# Patient Record
Sex: MALE | Race: AMERICAN INDIAN OR ALASKA NATIVE | Employment: UNEMPLOYED | URBAN - METROPOLITAN AREA
[De-identification: names, ages, dates, MRNs, and addresses within clinical notes are randomized per-mention and may not be internally consistent; named-entity substitution may affect disease eponyms.]

---

## 2021-09-21 ENCOUNTER — TELEPHONE (OUTPATIENT)
Dept: INTERNAL MEDICINE CLINIC | Age: 66
End: 2021-09-21

## 2021-09-21 NOTE — TELEPHONE ENCOUNTER
----- Message from Brittany Brasher sent at 9/20/2021 11:39 AM EDT -----  Regarding: Dr. Renetta Wakefield: [phone]  Appointment not available    Caller's first and last name and relationship to patient (if not the patient): n/a      Best contact number: [phone]      Preferred date and time: First available      Scheduled appointment date and time: n/a      Reason for appointment: PT is NP looking to est PCP. Wants to schedule appointment.       Details to clarify the request: n/a      Brittany Brasher

## 2021-09-21 NOTE — TELEPHONE ENCOUNTER
Left patient VM making aware that the first new patient slot available is mid November and to please call the office back to schedule.

## 2021-11-02 ENCOUNTER — OFFICE VISIT (OUTPATIENT)
Dept: INTERNAL MEDICINE CLINIC | Age: 66
End: 2021-11-02
Payer: COMMERCIAL

## 2021-11-02 VITALS
HEART RATE: 77 BPM | BODY MASS INDEX: 27.09 KG/M2 | DIASTOLIC BLOOD PRESSURE: 68 MMHG | SYSTOLIC BLOOD PRESSURE: 132 MMHG | OXYGEN SATURATION: 92 % | TEMPERATURE: 98 F | WEIGHT: 204.4 LBS | HEIGHT: 73 IN | RESPIRATION RATE: 14 BRPM

## 2021-11-02 DIAGNOSIS — Z79.899 ENCOUNTER FOR LONG-TERM (CURRENT) USE OF OTHER MEDICATIONS: ICD-10-CM

## 2021-11-02 DIAGNOSIS — Z76.89 ESTABLISHING CARE WITH NEW DOCTOR, ENCOUNTER FOR: Primary | ICD-10-CM

## 2021-11-02 DIAGNOSIS — J12.82 PNEUMONIA DUE TO COVID-19 VIRUS: ICD-10-CM

## 2021-11-02 DIAGNOSIS — Z11.59 ENCOUNTER FOR HEPATITIS C SCREENING TEST FOR LOW RISK PATIENT: ICD-10-CM

## 2021-11-02 DIAGNOSIS — U07.1 PNEUMONIA DUE TO COVID-19 VIRUS: ICD-10-CM

## 2021-11-02 DIAGNOSIS — F17.200 TOBACCO DEPENDENCE: ICD-10-CM

## 2021-11-02 DIAGNOSIS — Z12.11 COLON CANCER SCREENING: ICD-10-CM

## 2021-11-02 DIAGNOSIS — E78.5 HYPERLIPIDEMIA, UNSPECIFIED HYPERLIPIDEMIA TYPE: ICD-10-CM

## 2021-11-02 PROCEDURE — 99406 BEHAV CHNG SMOKING 3-10 MIN: CPT | Performed by: FAMILY MEDICINE

## 2021-11-02 PROCEDURE — 99203 OFFICE O/P NEW LOW 30 MIN: CPT | Performed by: FAMILY MEDICINE

## 2021-11-02 RX ORDER — ALBUTEROL SULFATE 90 UG/1
2 AEROSOL, METERED RESPIRATORY (INHALATION)
COMMUNITY
Start: 2021-09-10

## 2021-11-02 NOTE — PROGRESS NOTES
SPORTS MEDICINE AND PRIMARY CARE  Mykel Ortega. MD Noa  [address] [address]    Chief Complaint   Patient presents with    New Patient     establish care, history of Covid-19 virus       SUBJECTIVE:    Jessika Baldwin is a 77 y.o. male for [facility] establishment. Past medical history of Covid 19 pneumonia related hospital admission in August, possible COPD and tobacco dependency. Patient returned home oxygen completing use. He has persistent mild MARTINEZ on occasion. Patient denies fevers chills malaise or wheezing. Appetite is good. He recently stopped smoking. Pulmonary visit November 22. Care gaps  Flu vaccine-patient declines  COVID vaccine- no record    Current Outpatient Medications   Medication Sig Dispense Refill    albuterol (PROVENTIL HFA, VENTOLIN HFA, PROAIR HFA) 90 mcg/actuation inhaler Take 2 Puffs by inhalation every four (4) hours as needed. History reviewed. No pertinent past medical history.   Past Surgical History:   Procedure Laterality Date    HX COLONOSCOPY      [facility]     No Known Allergies    REVIEW OF SYSTEMS:  General: negative for - chills or fever  ENT: negative for - headaches, nasal congestion, tinnitus, hearing loss, vision changes, sore throat  Respiratory: shortness of breath; negative for - cough, hemoptysis,  or wheezing  Cardiovascular : negative for - chest pain, edema, palpitations or shortness of breath  Gastrointestinal: negative for - abdominal pain, blood in stools, heartburn or nausea/vomiting, diarrhea, constipation  Genito-Urinary: no dysuria, trouble voiding, hematuria or erectile dysfunction  Musculoskeletal: negative for - gait disturbance, joint pain, joint stiffness , joint swelling, muscle aches  Neurological: no TIA or stroke symptoms  Hematologic: no bruises, no bleeding, no swollen glands  Integument: no lumps, mole changes, nail changes or rash  Endocrine:no malaise/lethargy or unexpected weight changes      Social History Socioeconomic History    Marital status: SINGLE   Tobacco Use    Smoking status: Former Smoker     Years: 50.00     Types: Cigarettes     Quit date: 2021     Years since quittin.1    Smokeless tobacco: Never Used   Vaping Use    Vaping Use: Never used   Substance and Sexual Activity    Alcohol use: Yes     Alcohol/week: 2.0 standard drinks     Types: 2 Cans of beer per week    Drug use: Never    Sexual activity: Not Currently     Family History   Problem Relation Age of Onset    Hearing Impairment Brother        OBJECTIVE:     Visit Vitals  /68   Pulse 77   Temp 98 °F (36.7 °C) (Oral)   Resp 14   Ht 6' 1\" (1.854 m)   Wt 204 lb 6.4 oz (92.7 kg)   SpO2 92%   BMI 26.97 kg/m²     CONSTITUTIONAL: well developed, well nourished, appears age appropriate  EYES: perrla, eom intact  ENMT:moist mucous membranes, pharynx clear  NECK: supple. Thyroid normal  RESPIRATORY: Chest: clear bilaterally  CARDIOVASCULAR: Heart: regular rate and rhythm pulse 1+  GASTROINTESTINAL: Abdomen: soft, bowel sounds active  HEMATOLOGIC: no pathological lymph nodes palpated  MUSCULOSKELETAL: Extremities: no edema,   INTEGUMENT: No unusual rashes or suspicious skin lesions noted. Nails appear normal.  NEUROLOGIC: non-focal exam   MENTAL STATUS: alert and oriented, appropriate affect     ASSESSMENT:   1. Establishing care with new doctor, encounter for    2. Pneumonia due to COVID-19 virus -largely resolved symptoms   3. Hyperlipidemia screening   4. Encounter for long-term (current) use of other medications    5. Colon cancer screening    6.  Encounter for hepatitis C screening test for low risk patient        PLAN:  .  Orders Placed This Encounter    LIPID PANEL    OCCULT BLOOD IMMUNOASSAY,DIAGNOSTIC    HEPATITIS C AB    CBC WITH AUTOMATED DIFF    METABOLIC PANEL, COMPREHENSIVE    albuterol (PROVENTIL HFA, VENTOLIN HFA, PROAIR HFA) 90 mcg/actuation inhaler     Pulmonary follow-up scheduled this month    Follow-up and Dispositions    · Return in about 3 months (around 2/2/2022). I have discussed the diagnosis with the patient and the intended plan as seen in the  orders above. The patient understands and agrees with the plan. The patient has   received an after visit summary. Questions were answered concerning  future plans  Patient labs and/or xrays were reviewed as available. Past records were reviewed as available. Counseled regarding diet, exercise and healthy lifestyle      TOBACCO COUNSELING:  Spent 3minutes discussing the risks of smoking and the benefits of smoking cessation as well as the long term sequelae of smoking with the pt who verbalized his understanding. Reviewed strategies for success, including gradually decreasing the number of cigarettes smoked a day. Advised patient to proceed to urgent care, call back or return to office if symptoms develop/worsen/change/persist.    Discussed expected course/resolution/complications of diagnosis in detail with patient. Medication risks/benefits/costs/interactions/alternatives discussed with patient    Savanah Chowdhury M.D. This note was created using voice recognition software.   Edits have been made but syntax errors might exist.

## 2021-11-02 NOTE — PROGRESS NOTES
Chief Complaint   Patient presents with    New Patient     establish care, history of Covid-19 virus         1. Have you been to the ER, urgent care clinic since your last visit? Hospitalized since your last visit? Yes When: 3 months ago  Where: [facility]  Reason for visit: positive for Covid-19    2. Have you seen or consulted any other health care providers outside of the [address] [place] [address] since your last visit? Include any pap smears or colon screening.  No

## 2021-11-03 LAB
ALBUMIN SERPL-MCNC: 3.8 G/DL (ref 3.5–5)
ALBUMIN/GLOB SERPL: 1.2 {RATIO} (ref 1.1–2.2)
ALP SERPL-CCNC: 102 U/L (ref 45–117)
ALT SERPL-CCNC: 23 U/L (ref 12–78)
ANION GAP SERPL CALC-SCNC: 3 MMOL/L (ref 5–15)
AST SERPL-CCNC: 14 U/L (ref 15–37)
BASOPHILS # BLD: 0 K/UL (ref 0–0.1)
BASOPHILS NFR BLD: 0 % (ref 0–1)
BILIRUB SERPL-MCNC: 0.5 MG/DL (ref 0.2–1)
BUN SERPL-MCNC: 17 MG/DL (ref 6–20)
BUN/CREAT SERPL: 17 (ref 12–20)
CALCIUM SERPL-MCNC: 9.2 MG/DL (ref 8.5–10.1)
CHLORIDE SERPL-SCNC: 108 MMOL/L (ref 97–108)
CHOLEST SERPL-MCNC: 145 MG/DL
CO2 SERPL-SCNC: 27 MMOL/L (ref 21–32)
CREAT SERPL-MCNC: 1 MG/DL (ref 0.7–1.3)
DIFFERENTIAL METHOD BLD: NORMAL
EOSINOPHIL # BLD: 0.2 K/UL (ref 0–0.4)
EOSINOPHIL NFR BLD: 2 % (ref 0–7)
ERYTHROCYTE [DISTWIDTH] IN BLOOD BY AUTOMATED COUNT: 13.6 % (ref 11.5–14.5)
GLOBULIN SER CALC-MCNC: 3.2 G/DL (ref 2–4)
GLUCOSE SERPL-MCNC: 103 MG/DL (ref 65–100)
HCT VFR BLD AUTO: 44.9 % (ref 36.6–50.3)
HCV AB SERPL QL IA: NONREACTIVE
HDLC SERPL-MCNC: 37 MG/DL
HDLC SERPL: 3.9 {RATIO} (ref 0–5)
HGB BLD-MCNC: 14.1 G/DL (ref 12.1–17)
IMM GRANULOCYTES # BLD AUTO: 0 K/UL (ref 0–0.04)
IMM GRANULOCYTES NFR BLD AUTO: 0 % (ref 0–0.5)
LDLC SERPL CALC-MCNC: 77 MG/DL (ref 0–100)
LYMPHOCYTES # BLD: 1.9 K/UL (ref 0.8–3.5)
LYMPHOCYTES NFR BLD: 21 % (ref 12–49)
MCH RBC QN AUTO: 28.7 PG (ref 26–34)
MCHC RBC AUTO-ENTMCNC: 31.4 G/DL (ref 30–36.5)
MCV RBC AUTO: 91.3 FL (ref 80–99)
MONOCYTES # BLD: 0.6 K/UL (ref 0–1)
MONOCYTES NFR BLD: 7 % (ref 5–13)
NEUTS SEG # BLD: 6.2 K/UL (ref 1.8–8)
NEUTS SEG NFR BLD: 70 % (ref 32–75)
NRBC # BLD: 0 K/UL (ref 0–0.01)
NRBC BLD-RTO: 0 PER 100 WBC
PLATELET # BLD AUTO: 294 K/UL (ref 150–400)
PMV BLD AUTO: 10.6 FL (ref 8.9–12.9)
POTASSIUM SERPL-SCNC: 4.8 MMOL/L (ref 3.5–5.1)
PROT SERPL-MCNC: 7 G/DL (ref 6.4–8.2)
RBC # BLD AUTO: 4.92 M/UL (ref 4.1–5.7)
SODIUM SERPL-SCNC: 138 MMOL/L (ref 136–145)
TRIGL SERPL-MCNC: 155 MG/DL (ref ?–150)
VLDLC SERPL CALC-MCNC: 31 MG/DL
WBC # BLD AUTO: 8.9 K/UL (ref 4.1–11.1)

## 2021-11-05 NOTE — ACP (ADVANCE CARE PLANNING)
Advance Care Planning     General Advance Care Planning (ACP) Conversation      Date of Conversation: 11/2/2021  Conducted with: Patient with Decision Making Capacity    Healthcare Decision Maker:   No healthcare decision makers have been documented.    Click here to complete [address] including selection of the Healthcare Decision Maker Relationship (ie \"Primary\")        Content/Action Overview:   Has NO ACP documents/care preferences - information provided, considering goals and options  Reviewed DNR/DNI and patient elects Full Code (Attempt Resuscitation)         Length of Voluntary ACP Conversation in minutes:  <16 minutes (Non-Billable)    Markel Garner MD

## 2022-02-03 ENCOUNTER — OFFICE VISIT (OUTPATIENT)
Dept: INTERNAL MEDICINE CLINIC | Age: 67
End: 2022-02-03
Payer: MEDICARE

## 2022-02-03 VITALS
RESPIRATION RATE: 18 BRPM | DIASTOLIC BLOOD PRESSURE: 88 MMHG | HEIGHT: 73 IN | BODY MASS INDEX: 29.49 KG/M2 | WEIGHT: 222.5 LBS | OXYGEN SATURATION: 95 % | TEMPERATURE: 99 F | HEART RATE: 80 BPM | SYSTOLIC BLOOD PRESSURE: 172 MMHG

## 2022-02-03 DIAGNOSIS — R03.0 ELEVATED BLOOD PRESSURE READING IN OFFICE WITHOUT DIAGNOSIS OF HYPERTENSION: ICD-10-CM

## 2022-02-03 DIAGNOSIS — Z23 ENCOUNTER FOR IMMUNIZATION: ICD-10-CM

## 2022-02-03 DIAGNOSIS — Z87.01 HISTORY OF VIRAL PNEUMONIA: ICD-10-CM

## 2022-02-03 DIAGNOSIS — U09.9 COVID-19 LONG HAULER: Primary | ICD-10-CM

## 2022-02-03 DIAGNOSIS — Z71.85 VACCINE COUNSELING: ICD-10-CM

## 2022-02-03 PROCEDURE — 99213 OFFICE O/P EST LOW 20 MIN: CPT | Performed by: FAMILY MEDICINE

## 2022-02-03 PROCEDURE — 2000F BLOOD PRESSURE MEASURE: CPT | Performed by: FAMILY MEDICINE

## 2022-02-03 PROCEDURE — 90732 PPSV23 VACC 2 YRS+ SUBQ/IM: CPT | Performed by: FAMILY MEDICINE

## 2022-02-03 NOTE — PROGRESS NOTES
SPORTS MEDICINE AND PRIMARY CARE  Lynnette Guerrero. MD [place]  [address] [address]    Chief Complaint   Patient presents with    Follow-up    Pneumonia     september 2021       SUBJECTIVE:    Haritha Awan is a 79 y.o. male for follow-up evaluation. Covid pneumonia diagnosed in August 2021. Doing well at this point. Home pulse ox running 96%. Consulted with pulmonary medicine. Had a negative CT scan of the lungs. Had pulmonary function testing. Using Spiriva and albuterol mdis. REVIEW OF SYSTEMS:  General: negative for - chills or fever  ENT: negative for - headaches, nasal congestion, tinnitus, hearing loss, vision changes, sore throat  Respiratory: negative for - cough, hemoptysis, shortness of breath or wheezing  Cardiovascular : negative for - chest pain, edema, palpitations or shortness of breath  Gastrointestinal: negative for - abdominal pain, blood in stools, heartburn or nausea/vomiting, diarrhea, constipation  Genito-Urinary: no dysuria, trouble voiding, hematuria or erectile dysfunction  Musculoskeletal: negative for - gait disturbance, joint pain, joint stiffness , joint swelling, muscle aches  Neurological: no TIA or stroke symptoms  Hematologic: no bruises, no bleeding, no swollen glands  Integument: no lumps, mole changes, nail changes or rash  Endocrine:no malaise/lethargy or unexpected weight changes    Blood pressure elevation  No hx htn, bp normal at recent medical evaluations  Had a few drinks last night    Care gaps  covid vaccine- hesitant  Pneumovax- no history  crc screening- 2020,  [facility], no polyps  Current Outpatient Medications   Medication Sig Dispense Refill    tiotropium bromide (Spiriva Respimat) 2.5 mcg/actuation inhaler Take 2 Puffs by inhalation daily. Indications: bronchospasm prevention with COPD, copd      albuterol (PROVENTIL HFA, VENTOLIN HFA, PROAIR HFA) 90 mcg/actuation inhaler Take 2 Puffs by inhalation every four (4) hours as needed.        Past Medical History:   Diagnosis Date    Chronic obstructive pulmonary disease (HCC)      Past Surgical History:   Procedure Laterality Date    HX COLONOSCOPY      [facility]     No Known Allergies        Social History     Socioeconomic History    Marital status: SINGLE   Tobacco Use    Smoking status: Former Smoker     Years: 50.00     Types: Cigarettes     Quit date: 2021     Years since quittin.4    Smokeless tobacco: Never Used   Vaping Use    Vaping Use: Never used   Substance and Sexual Activity    Alcohol use: Yes     Alcohol/week: 2.0 standard drinks     Types: 2 Cans of beer per week    Drug use: Never    Sexual activity: Not Currently     Family History   Problem Relation Age of Onset    Hearing Impairment Brother        OBJECTIVE:     Visit Vitals  BP (!) 172/88   Pulse 80   Temp 99 °F (37.2 °C) (Oral)   Resp 18   Ht 6' 1\" (1.854 m)   Wt 222 lb 8 oz (100.9 kg)   SpO2 95%   BMI 29.36 kg/m²   CONSTITUTIONAL:  appears in usual state of health  EYES:  eom intact  ENMT:moist mucous membranes,  NECK: supple. COR:rrr   RESPIRATORY: Chest: clear bilaterally  INTEGUMENT: warm and dry  NEUROLOGIC: non-focal exam   MENTAL STATUS: alert and oriented, appropriate affect     No visits with results within 3 Month(s) from this visit.    Latest known visit with results is:   Office Visit on 2021   Component Date Value Ref Range Status    Sodium 2021 138  136 - 145 mmol/L Final    Potassium 2021 4.8  3.5 - 5.1 mmol/L Final    Chloride 2021 108  97 - 108 mmol/L Final    CO2 2021 27  21 - 32 mmol/L Final    Anion gap 2021 3* 5 - 15 mmol/L Final    Glucose 2021 103* 65 - 100 mg/dL Final    BUN 2021 17  6 - 20 MG/DL Final    Creatinine 2021 1.00  0.70 - 1.30 MG/DL Final    BUN/Creatinine ratio 2021 17  12 - 20   Final    GFR est AA 2021 >60  >60 ml/min/1.73m2 Final    GFR est non-AA 2021 >60  >60 ml/min/1.73m2 Final    Comment: Estimated GFR is calculated using the IDMS-traceable Modification of Diet in  Renal Disease (MDRD) Study equation, reported for both  Americans  (GFRAA) and non- Americans (GFRNA), and normalized to 1.73m2 body  surface area. The physician must decide which value applies to the patient.  Calcium 11/02/2021 9.2  8.5 - 10.1 MG/DL Final    Bilirubin, total 11/02/2021 0.5  0.2 - 1.0 MG/DL Final    ALT (SGPT) 11/02/2021 23  12 - 78 U/L Final    AST (SGOT) 11/02/2021 14* 15 - 37 U/L Final    Alk. phosphatase 11/02/2021 102  45 - 117 U/L Final    Protein, total 11/02/2021 7.0  6.4 - 8.2 g/dL Final    Albumin 11/02/2021 3.8  3.5 - 5.0 g/dL Final    Globulin 11/02/2021 3.2  2.0 - 4.0 g/dL Final    A-G Ratio 11/02/2021 1.2  1.1 - 2.2   Final    WBC 11/02/2021 8.9  4.1 - 11.1 K/uL Final    RBC 11/02/2021 4.92  4.10 - 5.70 M/uL Final    HGB 11/02/2021 14.1  12.1 - 17.0 g/dL Final    HCT 11/02/2021 44.9  36.6 - 50.3 % Final    MCV 11/02/2021 91.3  80.0 - 99.0 FL Final    MCH 11/02/2021 28.7  26.0 - 34.0 PG Final    MCHC 11/02/2021 31.4  30.0 - 36.5 g/dL Final    RDW 11/02/2021 13.6  11.5 - 14.5 % Final    PLATELET 77/48/5800 563  150 - 400 K/uL Final    MPV 11/02/2021 10.6  8.9 - 12.9 FL Final    NRBC 11/02/2021 0.0  0  WBC Final    ABSOLUTE NRBC 11/02/2021 0.00  0.00 - 0.01 K/uL Final    NEUTROPHILS 11/02/2021 70  32 - 75 % Final    LYMPHOCYTES 11/02/2021 21  12 - 49 % Final    MONOCYTES 11/02/2021 7  5 - 13 % Final    EOSINOPHILS 11/02/2021 2  0 - 7 % Final    BASOPHILS 11/02/2021 0  0 - 1 % Final    IMMATURE GRANULOCYTES 11/02/2021 0  0.0 - 0.5 % Final    ABS. NEUTROPHILS 11/02/2021 6.2  1.8 - 8.0 K/UL Final    ABS. LYMPHOCYTES 11/02/2021 1.9  0.8 - 3.5 K/UL Final    ABS. MONOCYTES 11/02/2021 0.6  0.0 - 1.0 K/UL Final    ABS. EOSINOPHILS 11/02/2021 0.2  0.0 - 0.4 K/UL Final    ABS. BASOPHILS 11/02/2021 0.0  0.0 - 0.1 K/UL Final    ABS. IMM. GRANS.  11/02/2021 0.0  0.00 - 0.04 K/UL Final    DF 11/02/2021 AUTOMATED    Final    Hep C virus Ab Interp. 11/02/2021 NONREACTIVE  NONREACTIVE   Final    Method used is Siemens Advia Centaur    Cholesterol, total 11/02/2021 145  <200 MG/DL Final    Triglyceride 11/02/2021 155* <150 MG/DL Final    Comment: Based on NCEP-ATP III:  Triglycerides <150 mg/dL  is considered normal, 150-199  mg/dL  borderline high,  200-499 mg/dL high and  greater than or equal to 500  mg/dL very high.  HDL Cholesterol 11/02/2021 37  MG/DL Final    Comment: Based on NCEP ATP III, HDL Cholesterol <40 mg/dL is considered low and >60  mg/dL is elevated.  LDL, calculated 11/02/2021 77  0 - 100 MG/DL Final    Comment: Based on the NCEP-ATP: LDL-C concentrations are considered  optimal <100 mg/dL,  near optimal/above Normal 100-129 mg/dL Borderline High: 130-159, High: 160-189  mg/dL Very High: Greater than or equal to 190 mg/dL      VLDL, calculated 11/02/2021 31  MG/DL Final    CHOL/HDL Ratio 11/02/2021 3.9  0.0 - 5.0   Final       ASSESSMENT:   1. COVID-19 long hauler    2. History of viral pneumonia    3. Elevated blood pressure reading in office without diagnosis of hypertension    4. Vaccine counseling    5. Encounter for immunization      pulmonary status stable following covid pneumonia in august 2021-long haul symptoms suspected  Elevated blood pressure following etoh, no history of chronic htn       Orders Placed This Encounter    Pneumococcal polysaccharide vaccine, 23-valent, adult or immunosuppressed pt dose    tiotropium bromide (Spiriva Respimat) 2.5 mcg/actuation inhaler       Follow-up and Dispositions    · Return in about 2 weeks (around 2/17/2022) for blood pressure follow up, annual wellness exam.  Follow-up and Disposition History         I have discussed the diagnosis with the patient and the intended plan as seen in the  orders above. The patient understands and agrees with the plan. The patient has   received an after visit summary. Questions were answered concerning  future plans  Patient labs and/or xrays were reviewed as available. Past records were reviewed as available. Counseled regarding diet, exercise and healthy lifestyle        Advised patient to call back or return to office if symptoms develop/worsen/change/persist.  Discussed expected course/resolution/complications of diagnosis in detail with patient. Medication risks/benefits/costs/interactions/alternatives considered    Signed,  Roberto Lemus M.D. This note was created using voice recognition software.   Edits have been made but syntax errors might exist.

## 2022-02-03 NOTE — PROGRESS NOTES
Chief Complaint   Patient presents with    Follow-up    Pneumonia     september 2021     1. Have you been to the ER, urgent care clinic since your last visit? Hospitalized since your last visit? No    2. Have you seen or consulted any other health care providers outside of the [address] [place] [address] since your last visit? Include any pap smears or colon screening.  No  Visit Vitals  BP (!) 172/88   Pulse 80   Temp 99 °F (37.2 °C) (Oral)   Resp 18   Ht 6' 1\" (1.854 m)   Wt 222 lb 8 oz (100.9 kg)   SpO2 95%   BMI 29.36 kg/m²

## 2022-02-17 ENCOUNTER — OFFICE VISIT (OUTPATIENT)
Dept: INTERNAL MEDICINE CLINIC | Age: 67
End: 2022-02-17
Payer: MEDICARE

## 2022-02-17 VITALS
OXYGEN SATURATION: 98 % | BODY MASS INDEX: 29.28 KG/M2 | TEMPERATURE: 98.5 F | HEART RATE: 91 BPM | RESPIRATION RATE: 18 BRPM | SYSTOLIC BLOOD PRESSURE: 146 MMHG | HEIGHT: 73 IN | WEIGHT: 220.9 LBS | DIASTOLIC BLOOD PRESSURE: 88 MMHG

## 2022-02-17 DIAGNOSIS — L30.9 DERMATITIS: ICD-10-CM

## 2022-02-17 DIAGNOSIS — Z87.891 HISTORY OF TOBACCO USE: ICD-10-CM

## 2022-02-17 DIAGNOSIS — I25.2 ECG: OLD MYOCARDIAL INFARCTION: ICD-10-CM

## 2022-02-17 DIAGNOSIS — Z00.00 MEDICARE ANNUAL WELLNESS VISIT, SUBSEQUENT: Primary | ICD-10-CM

## 2022-02-17 DIAGNOSIS — I73.9 PAD (PERIPHERAL ARTERY DISEASE) (HCC): ICD-10-CM

## 2022-02-17 DIAGNOSIS — I48.91 ATRIAL FIBRILLATION, UNSPECIFIED TYPE (HCC): ICD-10-CM

## 2022-02-17 PROCEDURE — 3017F COLORECTAL CA SCREEN DOC REV: CPT | Performed by: FAMILY MEDICINE

## 2022-02-17 PROCEDURE — G8536 NO DOC ELDER MAL SCRN: HCPCS | Performed by: FAMILY MEDICINE

## 2022-02-17 PROCEDURE — 99214 OFFICE O/P EST MOD 30 MIN: CPT | Performed by: FAMILY MEDICINE

## 2022-02-17 PROCEDURE — 1101F PT FALLS ASSESS-DOCD LE1/YR: CPT | Performed by: FAMILY MEDICINE

## 2022-02-17 PROCEDURE — G0439 PPPS, SUBSEQ VISIT: HCPCS | Performed by: FAMILY MEDICINE

## 2022-02-17 PROCEDURE — 93000 ELECTROCARDIOGRAM COMPLETE: CPT | Performed by: FAMILY MEDICINE

## 2022-02-17 PROCEDURE — G8419 CALC BMI OUT NRM PARAM NOF/U: HCPCS | Performed by: FAMILY MEDICINE

## 2022-02-17 PROCEDURE — G8427 DOCREV CUR MEDS BY ELIG CLIN: HCPCS | Performed by: FAMILY MEDICINE

## 2022-02-17 PROCEDURE — G8510 SCR DEP NEG, NO PLAN REQD: HCPCS | Performed by: FAMILY MEDICINE

## 2022-02-17 NOTE — PROGRESS NOTES
SPORTS MEDICINE AND PRIMARY CARE  Brittaney Tierney. MD [place]  [address]  [id number]    Chief Complaint   Patient presents with    Complete Physical     Kilo Thompson is a 79 y.o. male for CPE. Patient voices no complaints. SUBJECTIVE:  1.  Do you follow a low fat diet?  no  2. Are you up to date on your Tdap (<10 years)?  no  3. Have you ever had a Pneumovax vaccine (>65)  no              PCV13               PPSV23   4. Have you had Zoster vaccine (>50)? no  5. Have you had the HPV - Gardasil (13- 46)? na  6. Have you had a screening test for hepatitis C virus? yes  7. Do you smoke? No   If > 65 and smoker, have you had a abdominal aortic aneurysm ultrasound screen? Do you agree to CT scan screening for lung cancer (age 52-79yo with 20pk-yr history and smokes or quit within          the past 15 yrs  6. Do you consider yourself overweight?  no  9. Is there a family history of CAD< age 48? no  8. Is there a family history of Cancer?  none recorded  11. Do you know your Cancer risks? yes  12. Have you had a colonoscopy? yes, [facility]  13. Have you been tested for HIV or other STI's?  (18-71 y/o)? yes  14. Have had a bone density scan or DEXA done(>65)? no  15. Have you had an EKG performed in the last five years (>50)? no  16. Have you had a PSA test done this year (50-69)? no  17. Do you follow a regular exercise program? yes    Current Outpatient Medications   Medication Sig Dispense Refill    tiotropium bromide (Spiriva Respimat) 2.5 mcg/actuation inhaler Take 2 Puffs by inhalation daily. Indications: bronchospasm prevention with COPD, copd      albuterol (PROVENTIL HFA, VENTOLIN HFA, PROAIR HFA) 90 mcg/actuation inhaler Take 2 Puffs by inhalation every four (4) hours as needed.        Past Medical History:   Diagnosis Date    Chronic obstructive pulmonary disease ([address]      Past Surgical History:   Procedure Laterality Date    HX COLONOSCOPY      [facility]     No Known Allergies    REVIEW OF SYSTEMS:  General: negative for - chills or fever  ENT: negative for - headaches, nasal congestion, tinnitus, hearing loss, vision changes, sore throat  Respiratory: negative for - cough, hemoptysis, shortness of breath or wheezing  Cardiovascular :palpitations, remote history; negative for - chest pain, edema, or shortness of breath  Gastrointestinal: negative for - abdominal pain, blood in stools, heartburn or nausea/vomiting, diarrhea, constipation  Genito-Urinary: no dysuria, trouble voiding, hematuria or erectile dysfunction  Musculoskeletal: negative for - gait disturbance, joint pain, joint stiffness , joint swelling, muscle aches  Neurological: no TIA or stroke symptoms  Hematologic: no bruises, no bleeding, no swollen glands  Integument: no lumps, mole changes, nail changes or rash  Endocrine:no malaise/lethargy or unexpected weight changes      Social History     Socioeconomic History    Marital status: SINGLE   Tobacco Use    Smoking status: Former Smoker     Years: 50.00     Types: Cigarettes     Quit date: 2021     Years since quittin.4    Smokeless tobacco: Never Used   Vaping Use    Vaping Use: Never used   Substance and Sexual Activity    Alcohol use: Yes     Alcohol/week: 2.0 standard drinks     Types: 2 Cans of beer per week    Drug use: Never    Sexual activity: Not Currently     Family History   Problem Relation Age of Onset    Hearing Impairment Brother        OBJECTIVE:     Visit Vitals  BP (!) 146/88   Pulse 91   Temp 98.5 °F (36.9 °C) (Oral)   Resp 18   Ht 6' 1\" (1.854 m)   Wt 220 lb 14.4 oz (100.2 kg)   SpO2 98%   BMI 29.14 kg/m²     General appearance: alert, cooperative, no distress, appears stated age  Head: Normocephalic, without obvious abnormality, atraumatic  Eyes: conjunctivae/corneas clear. PERRL, EOM's intact. Ears: normal TM's and external ear canals AU  Nose: Nares normal. Septum midline.  Mucosa normal. No drainage or sinus tenderness. Throat: Lips, mucosa, and tongue normal. Teeth and gums normal  Neck: supple, symmetrical, trachea midline, no adenopathy, thyroid: not enlarged, symmetric, no tenderness/mass/nodules, no carotid bruit and no JVD  Back: negative, symmetric, no curvature. ROM normal. No CVA tenderness. Lungs: clear to auscultation bilaterally  Breasts: no mass  Heart: normal rate, irregular  rhythm, S1, S2 normal, no murmur, click, rub or gallop  Abdomen: soft, non-tender. Bowel sounds normal. No masses,  no organomegaly  Pelvic: deferred  Extremities: extremities normal, atraumatic, no cyanosis or edema  Pulses: 1+ and symmetric  Skin: Skin color, texture, turgor normal.  Extensive scaling and redness over BLE between calves and ankles  Lymph nodes: Cervical, supraclavicular, and axillary nodes normal.  Neurologic: Alert and oriented X 3, normal strength and tone. Normal symmetric reflexes. Normal coordination and gait    No visits with results within 3 Month(s) from this visit. Latest known visit with results is:   Office Visit on 11/02/2021   Component Date Value Ref Range Status    Sodium 11/02/2021 138  136 - 145 mmol/L Final    Potassium 11/02/2021 4.8  3.5 - 5.1 mmol/L Final    Chloride 11/02/2021 108  97 - 108 mmol/L Final    CO2 11/02/2021 27  21 - 32 mmol/L Final    Anion gap 11/02/2021 3* 5 - 15 mmol/L Final    Glucose 11/02/2021 103* 65 - 100 mg/dL Final    BUN 11/02/2021 17  6 - 20 MG/DL Final    Creatinine 11/02/2021 1.00  0.70 - 1.30 MG/DL Final    BUN/Creatinine ratio 11/02/2021 17  12 - 20   Final    GFR est AA 11/02/2021 >60  >60 ml/min/1.73m2 Final    GFR est non-AA 11/02/2021 >60  >60 ml/min/1.73m2 Final    Comment: Estimated GFR is calculated using the IDMS-traceable Modification of Diet in  Renal Disease (MDRD) Study equation, reported for both  Americans  (GFRAA) and non- Americans (GFRNA), and normalized to 1.73m2 body  surface area.  The physician must decide which value applies to the patient.  Calcium 11/02/2021 9.2  8.5 - 10.1 MG/DL Final    Bilirubin, total 11/02/2021 0.5  0.2 - 1.0 MG/DL Final    ALT (SGPT) 11/02/2021 23  12 - 78 U/L Final    AST (SGOT) 11/02/2021 14* 15 - 37 U/L Final    Alk. phosphatase 11/02/2021 102  45 - 117 U/L Final    Protein, total 11/02/2021 7.0  6.4 - 8.2 g/dL Final    Albumin 11/02/2021 3.8  3.5 - 5.0 g/dL Final    Globulin 11/02/2021 3.2  2.0 - 4.0 g/dL Final    A-G Ratio 11/02/2021 1.2  1.1 - 2.2   Final    WBC 11/02/2021 8.9  4.1 - 11.1 K/uL Final    RBC 11/02/2021 4.92  4.10 - 5.70 M/uL Final    HGB 11/02/2021 14.1  12.1 - 17.0 g/dL Final    HCT 11/02/2021 44.9  36.6 - 50.3 % Final    MCV 11/02/2021 91.3  80.0 - 99.0 FL Final    MCH 11/02/2021 28.7  26.0 - 34.0 PG Final    MCHC 11/02/2021 31.4  30.0 - 36.5 g/dL Final    RDW 11/02/2021 13.6  11.5 - 14.5 % Final    PLATELET 39/61/3001 281  150 - 400 K/uL Final    MPV 11/02/2021 10.6  8.9 - 12.9 FL Final    NRBC 11/02/2021 0.0  0  WBC Final    ABSOLUTE NRBC 11/02/2021 0.00  0.00 - 0.01 K/uL Final    NEUTROPHILS 11/02/2021 70  32 - 75 % Final    LYMPHOCYTES 11/02/2021 21  12 - 49 % Final    MONOCYTES 11/02/2021 7  5 - 13 % Final    EOSINOPHILS 11/02/2021 2  0 - 7 % Final    BASOPHILS 11/02/2021 0  0 - 1 % Final    IMMATURE GRANULOCYTES 11/02/2021 0  0.0 - 0.5 % Final    ABS. NEUTROPHILS 11/02/2021 6.2  1.8 - 8.0 K/UL Final    ABS. LYMPHOCYTES 11/02/2021 1.9  0.8 - 3.5 K/UL Final    ABS. MONOCYTES 11/02/2021 0.6  0.0 - 1.0 K/UL Final    ABS. EOSINOPHILS 11/02/2021 0.2  0.0 - 0.4 K/UL Final    ABS. BASOPHILS 11/02/2021 0.0  0.0 - 0.1 K/UL Final    ABS. IMM. GRANS. 11/02/2021 0.0  0.00 - 0.04 K/UL Final    DF 11/02/2021 AUTOMATED    Final    Hep C virus Ab Interp.  11/02/2021 NONREACTIVE  NONREACTIVE   Final    Method used is Siemens Advia Centaur    Cholesterol, total 11/02/2021 145  <200 MG/DL Final    Triglyceride 11/02/2021 155* <150 MG/DL Final Comment: Based on NCEP-ATP III:  Triglycerides <150 mg/dL  is considered normal, 150-199  mg/dL  borderline high,  200-499 mg/dL high and  greater than or equal to 500  mg/dL very high.  HDL Cholesterol 11/02/2021 37  MG/DL Final    Comment: Based on NCEP ATP III, HDL Cholesterol <40 mg/dL is considered low and >60  mg/dL is elevated.  LDL, calculated 11/02/2021 77  0 - 100 MG/DL Final    Comment: Based on the NCEP-ATP: LDL-C concentrations are considered  optimal <100 mg/dL,  near optimal/above Normal 100-129 mg/dL Borderline High: 130-159, High: 160-189  mg/dL Very High: Greater than or equal to 190 mg/dL      VLDL, calculated 11/02/2021 31  MG/DL Final    CHOL/HDL Ratio 11/02/2021 3.9  0.0 - 5.0   Final       ASSESSMENT/PLAN:  1. Medicare annual wellness visit, subsequent    2. Atrial fibrillation, unspecified type ([facility] [address]    3. Dermatitis    4. PAD (peripheral artery disease) ([facility] [address]    5. ECG: old myocardial infarction    6. History of tobacco use      EKG consistent with atrial fibrillation, normal ventricular response. No history of this. Referred to emergency department. EKG consistent with old anteroseptal infarct. Refer to cardiology and ED follow-up visit. Peripheral arterial disease involving the lower extremities and associated dermatitis. Check ARISTIDES at follow-up visit. Smoking history. Refer for abdominal aortic ultrasound and inquire about last year he smoked to see if he qualifies for screening low-dose CT chest.  Prostate cancer screening. No history of LUTS. PSA screening next blood draw. Orders Placed This Encounter    US EXAM SCREENING AAA    AMB POC EKG ROUTINE W/ 12 LEADS, INTER & REP     I have discussed the diagnosis with the patient and the intended plan as seen in the  orders above. The patient understands and agrees with the plan. The patient has   received an after visit summary.  Questions were answered concerning  future plans  Patient labs and/or xrays were reviewed as available. Past records were reviewed as available. Counseled regarding diet, exercise and healthy lifestyle     Advised patient to proceed to urgent care  Discussed expected course/resolution/complications of diagnosis in detail with patient. Ariadna Taylor M.D. This note was created using voice recognition software.   Edits have been made but syntax errors might exist.

## 2022-02-17 NOTE — PROGRESS NOTES
Chief Complaint   Patient presents with    Complete Physical       1. Have you been to the ER, urgent care clinic since your last visit? Hospitalized since your last visit? No    2. Have you seen or consulted any other health care providers outside of the [address] [place] [address] since your last visit? Include any pap smears or colon screening. No  Visit Vitals  BP (!) 146/88   Pulse 91   Temp 98.5 °F (36.9 °C) (Oral)   Resp 18   Ht 6' 1\" (1.854 m)   Wt 220 lb 14.4 oz (100.2 kg)   SpO2 98%   BMI 29.14 kg/m²       This is the Subsequent Medicare Annual Wellness Exam, performed 12 months or more after the Initial AWV or the last Subsequent AWV    I have reviewed the patient's medical history in detail and updated the computerized patient record. Assessment/Plan   Education and counseling provided:  Are appropriate based on today's review and evaluation    1. Medicare annual wellness visit, subsequent  -     AMB POC EKG ROUTINE W/ 12 LEADS, INTER & REP  2. Atrial fibrillation, unspecified type ([facility] [address]  3. Dermatitis  4. PAD (peripheral artery disease) ([facility] [address]  5. ECG: old myocardial infarction  6. History of tobacco use  -     US EXAM SCREENING AAA;  Future       Depression Risk Factor Screening     3 most recent PHQ Screens 2/17/2022   Little interest or pleasure in doing things Not at all   Feeling down, depressed, irritable, or hopeless Not at all   Total Score PHQ 2 0   Trouble falling or staying asleep, or sleeping too much Not at all   Feeling tired or having little energy Not at all   Poor appetite, weight loss, or overeating Not at all   Feeling bad about yourself - or that you are a failure or have let yourself or your family down Not at all   Trouble concentrating on things such as school, work, reading, or watching TV Not at all   Moving or speaking so slowly that other people could have noticed; or the opposite being so fidgety that others notice Not at all   Thoughts of being better off dead, or hurting yourself in some way Not at all   PHQ 9 Score 0   How difficult have these problems made it for you to do your work, take care of your home and get along with others Not difficult at all       Alcohol & Drug Abuse Risk Screen    Do you average more than 1 drink per night or more than 7 drinks a week: No    In the past three months have you have had more than 4 drinks containing alcohol on one occasion: No          Functional Ability and Level of Safety    Hearing: Hearing is good. Activities of Daily Living: The home contains: no safety equipment. Patient does total self care      Ambulation: with no difficulty     Fall Risk:  Fall Risk Assessment, last 12 mths 2/17/2022   Able to walk? Yes   Fall in past 12 months? 0   Do you feel unsteady? 0   Are you worried about falling 0      Abuse Screen:  Patient is not abused       Cognitive Screening    Has your family/caregiver stated any concerns about your memory: no     Cognitive Screening: Normal - MMSE (Mini Mental Status Exam)    Health Maintenance Due     Health Maintenance Due   Topic Date Due    COVID-19 Vaccine (1) Never done    Colorectal Cancer Screening Combo  Never done    Flu Vaccine (1) Never done       Patient Care Team   Patient Care Team:  Ravi Haskins MD as PCP - General (Family Medicine)  Ravi Haskins MD as PCP - [facility]    History     Patient Active Problem List   Diagnosis Code    Atrial fibrillation [facility]) I48.91    Dermatitis L30.9    PAD (peripheral artery disease) ([facility] [address] I73.9    ECG: old myocardial infarction I25.2    History of tobacco use Z87.891     Past Medical History:   Diagnosis Date    Chronic obstructive pulmonary disease ([facility] [address]       Past Surgical History:   Procedure Laterality Date    [facility]X COLONOSCOPY      [facility]     Current Outpatient Medications   Medication Sig Dispense Refill    tiotropium bromide (Spiriva Respimat) 2.5 mcg/actuation inhaler Take 2 Puffs by inhalation daily.  Indications: bronchospasm prevention with COPD, copd      albuterol (PROVENTIL HFA, VENTOLIN HFA, PROAIR HFA) 90 mcg/actuation inhaler Take 2 Puffs by inhalation every four (4) hours as needed. No Known Allergies    Family History   Problem Relation Age of Onset    Hearing Impairment Brother      Social History     Tobacco Use    Smoking status: Former Smoker     Years: 50.00     Types: Cigarettes     Quit date: 2021     Years since quittin.4    Smokeless tobacco: Never Used   Substance Use Topics    Alcohol use:  Yes     Alcohol/week: 2.0 standard drinks     Types: 2 Cans of beer per week         Stan Grills

## 2022-02-20 PROBLEM — I48.91 ATRIAL FIBRILLATION (HCC): Status: ACTIVE | Noted: 2022-02-20

## 2022-02-20 PROBLEM — I25.2 ECG: OLD MYOCARDIAL INFARCTION: Status: ACTIVE | Noted: 2022-02-20

## 2022-02-20 PROBLEM — L30.9 DERMATITIS: Status: ACTIVE | Noted: 2022-02-20

## 2022-02-20 PROBLEM — Z87.891 HISTORY OF TOBACCO USE: Status: ACTIVE | Noted: 2022-02-20

## 2022-02-20 PROBLEM — I73.9 PAD (PERIPHERAL ARTERY DISEASE) (HCC): Status: ACTIVE | Noted: 2022-02-20

## 2022-02-22 NOTE — PATIENT INSTRUCTIONS
Medicare Wellness Visit, Male    The best way to live healthy is to have a lifestyle where you eat a well-balanced diet, exercise regularly, limit alcohol use, and quit all forms of tobacco/nicotine, if applicable. Regular preventive services are another way to keep healthy. Preventive services (vaccines, screening tests, monitoring & exams) can help personalize your care plan, which helps you manage your own care. Screening tests can find health problems at the earliest stages, when they are easiest to treat. [facility] follows the current, evidence-based guidelines published by the [facility] Kevin Aman ([facility]) when recommending preventive services for our patients. Because we follow these guidelines, sometimes recommendations change over time as research supports it. (For example, a prostate screening blood test is no longer routinely recommended for men with no symptoms). Of course, you and your doctor may decide to screen more often for some diseases, based on your risk and co-morbidities (chronic disease you are already diagnosed with). Preventive services for you include:  - Medicare offers their members a free annual wellness visit, which is time for you and your primary care provider to discuss and plan for your preventive service needs. Take advantage of this benefit every year!  -All adults over age 72 should receive the recommended pneumonia vaccines. Current USPSTF guidelines recommend a series of two vaccines for the best pneumonia protection.   -All adults should have a flu vaccine yearly and tetanus vaccine every 10 years.  -All adults age 48 and older should receive the shingles vaccines (series of two vaccines).        -All adults age 38-68 who are overweight should have a diabetes screening test once every three years.   -Other screening tests & preventive services for persons with diabetes include: an eye exam to screen for diabetic retinopathy, a kidney function test, a foot exam, and stricter control over your cholesterol.   -Cardiovascular screening for adults with routine risk involves an electrocardiogram (ECG) at intervals determined by the provider.   -Colorectal cancer screening should be done for adults age 54-65 with no increased risk factors for colorectal cancer. There are a number of acceptable methods of screening for this type of cancer. Each test has its own benefits and drawbacks. Discuss with your provider what is most appropriate for you during your annual wellness visit. The different tests include: colonoscopy (considered the best screening method), a fecal occult blood test, a fecal DNA test, and sigmoidoscopy.  -All adults born between [facility] should be screened once for Hepatitis C.  -An Abdominal Aortic Aneurysm (AAA) Screening is recommended for men age 73-68 who has ever smoked in their lifetime.      Here is a list of your current Health Maintenance items (your personalized list of preventive services) with a due date:  Health Maintenance Due   Topic Date Due    COVID-19 Vaccine (1) Never done    Colorectal Screening  Never done    Yearly Flu Vaccine (1) Never done

## 2022-03-18 PROBLEM — I73.9 PAD (PERIPHERAL ARTERY DISEASE) (HCC): Status: ACTIVE | Noted: 2022-02-20

## 2022-03-19 PROBLEM — L30.9 DERMATITIS: Status: ACTIVE | Noted: 2022-02-20

## 2022-03-19 PROBLEM — I25.2 ECG: OLD MYOCARDIAL INFARCTION: Status: ACTIVE | Noted: 2022-02-20

## 2022-03-20 PROBLEM — I48.91 ATRIAL FIBRILLATION (HCC): Status: ACTIVE | Noted: 2022-02-20

## 2022-05-09 ENCOUNTER — OFFICE VISIT (OUTPATIENT)
Dept: INTERNAL MEDICINE CLINIC | Age: 67
End: 2022-05-09
Payer: MEDICARE

## 2022-05-09 VITALS
HEART RATE: 96 BPM | OXYGEN SATURATION: 99 % | BODY MASS INDEX: 27.97 KG/M2 | DIASTOLIC BLOOD PRESSURE: 82 MMHG | SYSTOLIC BLOOD PRESSURE: 138 MMHG | WEIGHT: 217.9 LBS | HEIGHT: 74 IN | TEMPERATURE: 97.9 F | RESPIRATION RATE: 18 BRPM

## 2022-05-09 DIAGNOSIS — J44.9 CHRONIC OBSTRUCTIVE PULMONARY DISEASE, UNSPECIFIED COPD TYPE (HCC): ICD-10-CM

## 2022-05-09 DIAGNOSIS — I48.91 ATRIAL FIBRILLATION, UNSPECIFIED TYPE (HCC): Primary | ICD-10-CM

## 2022-05-09 DIAGNOSIS — I25.2 ECG: OLD MYOCARDIAL INFARCTION: ICD-10-CM

## 2022-05-09 PROCEDURE — 1101F PT FALLS ASSESS-DOCD LE1/YR: CPT | Performed by: FAMILY MEDICINE

## 2022-05-09 PROCEDURE — 99214 OFFICE O/P EST MOD 30 MIN: CPT | Performed by: FAMILY MEDICINE

## 2022-05-09 PROCEDURE — G8427 DOCREV CUR MEDS BY ELIG CLIN: HCPCS | Performed by: FAMILY MEDICINE

## 2022-05-09 PROCEDURE — G8419 CALC BMI OUT NRM PARAM NOF/U: HCPCS | Performed by: FAMILY MEDICINE

## 2022-05-09 PROCEDURE — G8510 SCR DEP NEG, NO PLAN REQD: HCPCS | Performed by: FAMILY MEDICINE

## 2022-05-09 PROCEDURE — G8536 NO DOC ELDER MAL SCRN: HCPCS | Performed by: FAMILY MEDICINE

## 2022-05-09 PROCEDURE — 3017F COLORECTAL CA SCREEN DOC REV: CPT | Performed by: FAMILY MEDICINE

## 2022-05-09 RX ORDER — DILTIAZEM HYDROCHLORIDE 180 MG/1
180 CAPSULE, EXTENDED RELEASE ORAL DAILY
Qty: 90 CAPSULE | Refills: 0 | Status: SHIPPED | OUTPATIENT
Start: 2022-05-09

## 2022-05-09 NOTE — PROGRESS NOTES
Rm    Chief Complaint   Patient presents with    Complete Physical        Visit Vitals  /82 (BP 1 Location: Left upper arm, BP Patient Position: Sitting, BP Cuff Size: Large adult)   Pulse 96   Temp 97.9 °F (36.6 °C) (Oral)   Resp 18   Ht 6' 1.5\" (1.867 m)   Wt 217 lb 14.4 oz (98.8 kg)   SpO2 99%   BMI 28.36 kg/m²        1. Have you been to the ER, urgent care clinic since your last visit? Hospitalized since your last visit? No    2. Have you seen or consulted any other health care providers outside of the [address] [place] [address] since your last visit? Include any pap smears or colon screening. No     Health Maintenance Due   Topic Date Due    COVID-19 Vaccine (1) Never done    Colorectal Cancer Screening Combo  Never done        3 most recent PHQ Screens 5/9/2022   Little interest or pleasure in doing things Not at all   Feeling down, depressed, irritable, or hopeless Not at all   Total Score PHQ 2 0   Trouble falling or staying asleep, or sleeping too much -   Feeling tired or having little energy -   Poor appetite, weight loss, or overeating -   Feeling bad about yourself - or that you are a failure or have let yourself or your family down -   Trouble concentrating on things such as school, work, reading, or watching TV -   Moving or speaking so slowly that other people could have noticed; or the opposite being so fidgety that others notice -   Thoughts of being better off dead, or hurting yourself in some way -   PHQ 9 Score -   How difficult have these problems made it for you to do your work, take care of your home and get along with others -        Fall Risk Assessment, last 12 mths 5/9/2022   Able to walk? Yes   Fall in past 12 months? 0   Do you feel unsteady?  0   Are you worried about falling 0       Learning Assessment 11/2/2021   PRIMARY LEARNER Patient   HIGHEST LEVEL OF EDUCATION - PRIMARY LEARNER  GRADUATED HIGH SCHOOL OR GED   BARRIERS PRIMARY LEARNER NONE   CO-LEARNER CAREGIVER No PRIMARY LANGUAGE ENGLISH   LEARNER PREFERENCE PRIMARY DEMONSTRATION   ANSWERED BY patient    RELATIONSHIP SELF

## 2022-05-09 NOTE — PROGRESS NOTES
SPORTS MEDICINE AND PRIMARY CARE  [facility]. MD Noa  [address] [address]    Chief Complaint   Patient presents with    Complete Physical       SUBJECTIVE:    Elisa Lopez is a 79 y.o. male for ED follow up. Patient visited office for physical exam in February and irregular rhythm on examination was diagnosed as atrial fibrillation. Patient was referred to ED. [facility] Patient was initiated on diltiazem and Xarelto. He has been unable to follow-up with the cardiologist.  His prescriptions ran out after about a week of therapy. Patient feels well denying palpitations shortness of breath chest pain or leg edema. History COPD    Nursing note and previous note and ecg/other lab reviewed  Current Outpatient Medications   Medication Sig Dispense Refill    rivaroxaban (XARELTO) 20 mg tab tablet Take 1 Tablet by mouth daily. 90 Tablet 0    dilTIAZem ER (DILACOR XR) 180 mg capsule Take 1 Capsule by mouth daily. 90 Capsule 0    albuterol (PROVENTIL HFA, VENTOLIN HFA, PROAIR HFA) 90 mcg/actuation inhaler Take 2 Puffs by inhalation every four (4) hours as needed.  tiotropium bromide (Spiriva Respimat) 2.5 mcg/actuation inhaler Take 2 Puffs by inhalation daily.  Indications: bronchospasm prevention with COPD, copd (Patient not taking: Reported on 5/9/2022)       Past Medical History:   Diagnosis Date    Chronic obstructive pulmonary disease ([address]      Past Surgical History:   Procedure Laterality Date    HX COLONOSCOPY      [facility]     No Known Allergies    REVIEW OF SYSTEMS:  General: negative for - chills or fever  ENT: negative for - headaches, nasal congestion, tinnitus, hearing loss, vision changes, sore throat  Respiratory: negative for - cough, hemoptysis, shortness of breath or wheezing  Cardiovascular : negative for - chest pain, edema, palpitations or shortness of breath  Gastrointestinal: negative for - abdominal pain, blood in stools, heartburn or nausea/vomiting, diarrhea, constipation  Genito-Urinary: no dysuria, trouble voiding, hematuria or erectile dysfunction  Musculoskeletal: negative for - gait disturbance, joint pain, joint stiffness , joint swelling, muscle aches  Neurological: no TIA or stroke symptoms  Hematologic: no bruises, no bleeding, no swollen glands  Integument: no lumps, mole changes, nail changes or rash  Endocrine:no malaise/lethargy or unexpected weight changes      Social History     Socioeconomic History    Marital status: SINGLE   Tobacco Use    Smoking status: Former Smoker     Years: 50.00     Types: Cigarettes     Quit date: 2021     Years since quittin.7    Smokeless tobacco: Never Used   Vaping Use    Vaping Use: Never used   Substance and Sexual Activity    Alcohol use: Yes     Alcohol/week: 2.0 standard drinks     Types: 2 Cans of beer per week    Drug use: Never    Sexual activity: Not Currently     Family History   Problem Relation Age of Onset    Hearing Impairment Brother        OBJECTIVE:     Visit Vitals  /82 (BP 1 Location: Left upper arm, BP Patient Position: Sitting, BP Cuff Size: Large adult)   Pulse 96   Temp 97.9 °F (36.6 °C) (Oral)   Resp 18   Ht 6' 1.5\" (1.867 m)   Wt 217 lb 14.4 oz (98.8 kg)   SpO2 99%   BMI 28.36 kg/m²     CONSTITUTIONAL: Pleasant cooperative in no distress  EYES: perrla, eom intact  ENMT:moist mucous membranes  NECK: supple. Thyroid normal  RESPIRATORY: Chest: clear bilaterally  CARDIOVASCULAR: Heart: irregular  Rhythm, rate 96 pulse 1+   GASTROINTESTINAL: Abdomen: soft, bowel sounds active  MUSCULOSKELETAL: Extremities: no edema,  INTEGUMENT: Chronic skin changes over BLE. NEUROLOGIC: non-focal exam   MENTAL STATUS: alert, appropriate affect     ASSESSMENT/PLAN:  1. Atrial fibrillation, unspecified type ([address]    2. ECG: old myocardial infarction    3. Chronic obstructive pulmonary disease, unspecified COPD type ([address]        Relatively new onset atrial fibrillation. Refill Xarelto and diltiazem. Refer patient to cardiology. COPD is stable. Patient will continue inhalers and remain off of cigarettes. .  Orders Placed This Encounter    REFERRAL TO CARDIOLOGY    rivaroxaban (XARELTO) 20 mg tab tablet    dilTIAZem ER (DILACOR XR) 180 mg capsule     I have discussed the diagnosis with the patient and the intended plan as seen in the  orders above. The patient understands and agrees with the plan. The patient has   received an after visit summary. Questions were answered concerning  future plans  Patient labs and/or xrays were reviewed as available. Past records were reviewed as available. Counseled regarding healthy lifestyle        Advised patient to proceed to urgent care, call back or return to office if symptoms develop/worsen/change/persist.  Discussed expected course/resolution/complications of diagnosis in detail with patient. Medication risks/benefits/costs/interactions/alternatives discussed with patient    Abhijeet Queen M.D. A total of at least 30 min was spent during this evaluation of which half was spent in counseling and care coordination. This note was created using voice recognition software.   Edits have been made but syntax errors might exist.

## 2022-05-15 PROBLEM — J44.9 CHRONIC OBSTRUCTIVE PULMONARY DISEASE (HCC): Status: ACTIVE | Noted: 2022-05-15

## 2022-05-20 ENCOUNTER — OFFICE VISIT (OUTPATIENT)
Dept: CARDIOLOGY CLINIC | Age: 67
End: 2022-05-20
Payer: MEDICARE

## 2022-05-20 VITALS
WEIGHT: 217 LBS | SYSTOLIC BLOOD PRESSURE: 134 MMHG | RESPIRATION RATE: 14 BRPM | DIASTOLIC BLOOD PRESSURE: 84 MMHG | HEIGHT: 74 IN | BODY MASS INDEX: 27.85 KG/M2 | HEART RATE: 70 BPM | OXYGEN SATURATION: 97 %

## 2022-05-20 DIAGNOSIS — R94.31 ABNORMAL EKG: ICD-10-CM

## 2022-05-20 DIAGNOSIS — J43.8 OTHER EMPHYSEMA (HCC): ICD-10-CM

## 2022-05-20 DIAGNOSIS — I48.19 PERSISTENT ATRIAL FIBRILLATION (HCC): Primary | ICD-10-CM

## 2022-05-20 PROCEDURE — 99204 OFFICE O/P NEW MOD 45 MIN: CPT | Performed by: SPECIALIST

## 2022-05-20 NOTE — PROGRESS NOTES
HISTORY OF PRESENT ILLNESS  Haroon Dunn is a 79 y.o. male     SUMMARY:   Problem List  Date Reviewed: 5/20/2022          Codes Class Noted    Chronic obstructive pulmonary disease ([facility] ICD-10-CM: J44.9  ICD-9-CM: 331  5/15/2022        Atrial fibrillation ([facility] ICD-10-CM: I48.91  ICD-9-CM: 427.31  2/20/2022        Dermatitis ICD-10-CM: L30.9  ICD-9-CM: 692.9  2/20/2022        PAD (peripheral artery disease) ([facility] ICD-10-CM: I73.9  ICD-9-CM: 443.9  2/20/2022        ECG: old myocardial infarction ICD-10-CM: I25.2  ICD-9-CM: 412  2/20/2022        History of tobacco use ICD-10-CM: Z87.891  ICD-9-CM: V15.82  2/20/2022              Current Outpatient Medications on File Prior to Visit   Medication Sig    rivaroxaban (XARELTO) 20 mg tab tablet Take 1 Tablet by mouth daily.  dilTIAZem ER (DILACOR XR) 180 mg capsule Take 1 Capsule by mouth daily.  albuterol (PROVENTIL HFA, VENTOLIN HFA, PROAIR HFA) 90 mcg/actuation inhaler Take 2 Puffs by inhalation every four (4) hours as needed. No current facility-administered medications on file prior to visit. CARDIOLOGY STUDIES TO DATE:  No specialty comments available. Chief Complaint   Patient presents with    New Patient     HPI :  He is referred by his primary care for cardiac evaluation. Back in February he came in for routine physical and was noted to have an irregular heartbeat. EKG showed atrial fibrillation with a slightly elevated ventricular rate and poor anterior R waves. He was sent to [facility] where he was evaluated and he was discharged on Xarelto and Cardizem. He was supposed to follow-up with a cardiologist but never did. Few weeks ago he ran out of his meds so he went back to his primary care and they were restarted. He has mild chronic dyspnea on exertion related to underlying COPD. He is completely unaware of the A. fib.   He has had some intermittent elevated blood pressures over the years and is a past smoker, having stopped after a 2-month hospitalization for COVID last fall. Cholesterol is unknown there is no history of diabetes and family history is negative for premature coronary disease. CARDIAC ROS:   negative for chest pain, palpitations, syncope, orthopnea, paroxysmal nocturnal dyspnea, exertional chest pressure/discomfort, claudication, lower extremity edema    Family History   Problem Relation Age of Onset    Hearing Impairment Brother     Heart Disease Sister         stents late 63's       Past Medical History:   Diagnosis Date    Chronic obstructive pulmonary disease ([address]        GENERAL ROS:  A comprehensive review of systems was negative except for that written in the HPI. Visit Vitals  /84 (BP 1 Location: Right arm, BP Patient Position: Sitting, BP Cuff Size: Adult)   Pulse 70   Resp 14   Ht 6' 1.5\" (1.867 m)   Wt 217 lb (98.4 kg)   SpO2 97%   BMI 28.24 kg/m²       Wt Readings from Last 3 Encounters:   05/20/22 217 lb (98.4 kg)   05/09/22 217 lb 14.4 oz (98.8 kg)   02/17/22 220 lb 14.4 oz (100.2 kg)            BP Readings from Last 3 Encounters:   05/20/22 134/84   05/09/22 138/82   02/17/22 (!) 146/88       PHYSICAL EXAM  General appearance: alert, cooperative, no distress, appears stated age  Neurologic: Alert and oriented X 3  Neck: supple, symmetrical, trachea midline, no adenopathy, no carotid bruit and no JVD  Lungs: clear to auscultation bilaterally  Heart: irregularly irregular rhythm, S1, S2 normal, no S3 or S4  Abdomen: soft, non-tender.  Bowel sounds normal. No masses,  no organomegaly  Extremities: extremities normal, atraumatic, no cyanosis or edema  Pulses: 2+ and symmetric    Lab Results   Component Value Date/Time    Cholesterol, total 145 11/02/2021 05:39 PM    HDL Cholesterol 37 11/02/2021 05:39 PM    LDL, calculated 77 11/02/2021 05:39 PM    Triglyceride 155 (H) 11/02/2021 05:39 PM    CHOL/HDL Ratio 3.9 11/02/2021 05:39 PM     ASSESSMENT :      He is stable and asymptomatic at this point on a good medical regimen. I think the only test he really needs right now is an echocardiogram.  His CHADS2 score is at least 1 maybe 2 for hypertension so I agree with lifelong oral anticoagulants. We talked about risk of stroke. Since he is asymptomatic I do not think there is any reason to try to get him back into sinus rhythm. current treatment plan is effective, no change in therapy  lab results and schedule of future lab studies reviewed with patient  reviewed diet, exercise and weight control    Encounter Diagnoses   Name Primary?  Persistent atrial fibrillation (HCC) Yes    Other emphysema (HCC)     Abnormal EKG      No orders of the defined types were placed in this encounter. Follow-up and Dispositions    · Return in about 6 months (around 11/20/2022). Joss Hutchinson MD  5/20/2022  Please note that this dictation was completed with [employer], the computer voice recognition software. Quite often unanticipated grammatical, syntax, homophones, and other interpretive errors are inadvertently transcribed by the computer software. Please disregard these errors. Please excuse any errors that have escaped final proofreading. Thank you.

## 2022-07-13 ENCOUNTER — ANCILLARY PROCEDURE (OUTPATIENT)
Dept: CARDIOLOGY CLINIC | Age: 67
End: 2022-07-13
Payer: MEDICARE

## 2022-07-13 VITALS — BODY MASS INDEX: 28.76 KG/M2 | WEIGHT: 217 LBS | HEIGHT: 73 IN

## 2022-07-13 DIAGNOSIS — I48.19 PERSISTENT ATRIAL FIBRILLATION (HCC): ICD-10-CM

## 2022-07-13 DIAGNOSIS — I10 HYPERTENSION, UNSPECIFIED TYPE: ICD-10-CM

## 2022-07-13 PROCEDURE — 93306 TTE W/DOPPLER COMPLETE: CPT | Performed by: SPECIALIST

## 2022-07-14 ENCOUNTER — TELEPHONE (OUTPATIENT)
Dept: CARDIOLOGY CLINIC | Age: 67
End: 2022-07-14

## 2022-07-14 LAB
ECHO AO ASC DIAM: 2.9 CM
ECHO AO ASCENDING AORTA INDEX: 1.3 CM/M2
ECHO AO ROOT DIAM: 2.9 CM
ECHO AO ROOT INDEX: 1.3 CM/M2
ECHO AV AREA PEAK VELOCITY: 2 CM2
ECHO AV AREA VTI: 2.3 CM2
ECHO AV AREA/BSA PEAK VELOCITY: 0.9 CM2/M2
ECHO AV AREA/BSA VTI: 1 CM2/M2
ECHO AV MEAN GRADIENT: 3 MMHG
ECHO AV MEAN VELOCITY: 0.8 M/S
ECHO AV PEAK GRADIENT: 7 MMHG
ECHO AV PEAK VELOCITY: 1.3 M/S
ECHO AV VELOCITY RATIO: 0.62
ECHO AV VTI: 22.8 CM
ECHO EST RA PRESSURE: 8 MMHG
ECHO LA DIAMETER INDEX: 2.6 CM/M2
ECHO LA DIAMETER: 5.8 CM
ECHO LA TO AORTIC ROOT RATIO: 2
ECHO LA VOL 2C: 123 ML (ref 18–58)
ECHO LA VOL 4C: 112 ML (ref 18–58)
ECHO LA VOL BP: 118 ML (ref 18–58)
ECHO LA VOL/BSA BIPLANE: 53 ML/M2 (ref 16–34)
ECHO LA VOLUME AREA LENGTH: 124 ML
ECHO LA VOLUME INDEX A2C: 55 ML/M2 (ref 16–34)
ECHO LA VOLUME INDEX A4C: 50 ML/M2 (ref 16–34)
ECHO LA VOLUME INDEX AREA LENGTH: 56 ML/M2 (ref 16–34)
ECHO LV EDV A2C: 48 ML
ECHO LV EDV A4C: 51 ML
ECHO LV EDV BP: 49 ML (ref 67–155)
ECHO LV EDV INDEX A4C: 23 ML/M2
ECHO LV EDV INDEX BP: 22 ML/M2
ECHO LV EDV NDEX A2C: 22 ML/M2
ECHO LV EJECTION FRACTION A2C: 68 %
ECHO LV EJECTION FRACTION A4C: 63 %
ECHO LV EJECTION FRACTION BIPLANE: 64 % (ref 55–100)
ECHO LV ESV A2C: 15 ML
ECHO LV ESV A4C: 19 ML
ECHO LV ESV BP: 18 ML (ref 22–58)
ECHO LV ESV INDEX A2C: 7 ML/M2
ECHO LV ESV INDEX A4C: 9 ML/M2
ECHO LV ESV INDEX BP: 8 ML/M2
ECHO LV FRACTIONAL SHORTENING: 33 % (ref 28–44)
ECHO LV INTERNAL DIMENSION DIASTOLE INDEX: 2.06 CM/M2
ECHO LV INTERNAL DIMENSION DIASTOLIC: 4.6 CM (ref 4.2–5.9)
ECHO LV INTERNAL DIMENSION SYSTOLIC INDEX: 1.39 CM/M2
ECHO LV INTERNAL DIMENSION SYSTOLIC: 3.1 CM
ECHO LV IVSD: 1.3 CM (ref 0.6–1)
ECHO LV MASS 2D: 284.8 G (ref 88–224)
ECHO LV MASS INDEX 2D: 127.7 G/M2 (ref 49–115)
ECHO LV POSTERIOR WALL DIASTOLIC: 1.7 CM (ref 0.6–1)
ECHO LV RELATIVE WALL THICKNESS RATIO: 0.74
ECHO LVOT AREA: 3.1 CM2
ECHO LVOT AV VTI INDEX: 0.71
ECHO LVOT DIAM: 2 CM
ECHO LVOT MEAN GRADIENT: 1 MMHG
ECHO LVOT PEAK GRADIENT: 3 MMHG
ECHO LVOT PEAK VELOCITY: 0.8 M/S
ECHO LVOT STROKE VOLUME INDEX: 22.7 ML/M2
ECHO LVOT SV: 50.6 ML
ECHO LVOT VTI: 16.1 CM
ECHO PV MAX VELOCITY: 1.1 M/S
ECHO PV PEAK GRADIENT: 5 MMHG
ECHO RIGHT VENTRICULAR SYSTOLIC PRESSURE (RVSP): 49 MMHG
ECHO RV TAPSE: 1.9 CM (ref 1.7–?)
ECHO TV REGURGITANT MAX VELOCITY: 3.21 M/S
ECHO TV REGURGITANT PEAK GRADIENT: 41 MMHG

## 2022-07-15 NOTE — TELEPHONE ENCOUNTER
Patient returned my call, two identifiers verified. Patient is notified about the result of his Echo.  ----- Message from Penny Bernal MD sent at 7/14/2022  8:55 AM EDT -----  Heart muscle is strong and couple of mildly leaky valves, not a problem or worry.  Stay on meds

## 2022-10-13 ENCOUNTER — OFFICE VISIT (OUTPATIENT)
Dept: INTERNAL MEDICINE CLINIC | Age: 67
End: 2022-10-13
Payer: MEDICARE

## 2022-10-13 VITALS
BODY MASS INDEX: 29.24 KG/M2 | DIASTOLIC BLOOD PRESSURE: 50 MMHG | HEART RATE: 72 BPM | WEIGHT: 220.6 LBS | TEMPERATURE: 97.9 F | OXYGEN SATURATION: 95 % | HEIGHT: 73 IN | RESPIRATION RATE: 18 BRPM | SYSTOLIC BLOOD PRESSURE: 132 MMHG

## 2022-10-13 DIAGNOSIS — M25.361 INSTABILITY OF RIGHT KNEE JOINT: ICD-10-CM

## 2022-10-13 DIAGNOSIS — Z13.6 ENCOUNTER FOR ABDOMINAL AORTIC ANEURYSM (AAA) SCREENING: ICD-10-CM

## 2022-10-13 DIAGNOSIS — J44.9 CHRONIC OBSTRUCTIVE PULMONARY DISEASE, UNSPECIFIED COPD TYPE (HCC): Primary | ICD-10-CM

## 2022-10-13 DIAGNOSIS — Z87.891 PERSONAL HISTORY OF TOBACCO USE: ICD-10-CM

## 2022-10-13 PROCEDURE — G8417 CALC BMI ABV UP PARAM F/U: HCPCS | Performed by: FAMILY MEDICINE

## 2022-10-13 PROCEDURE — G8427 DOCREV CUR MEDS BY ELIG CLIN: HCPCS | Performed by: FAMILY MEDICINE

## 2022-10-13 PROCEDURE — 1123F ACP DISCUSS/DSCN MKR DOCD: CPT | Performed by: FAMILY MEDICINE

## 2022-10-13 PROCEDURE — G8752 SYS BP LESS 140: HCPCS | Performed by: FAMILY MEDICINE

## 2022-10-13 PROCEDURE — G8510 SCR DEP NEG, NO PLAN REQD: HCPCS | Performed by: FAMILY MEDICINE

## 2022-10-13 PROCEDURE — 3017F COLORECTAL CA SCREEN DOC REV: CPT | Performed by: FAMILY MEDICINE

## 2022-10-13 PROCEDURE — G8754 DIAS BP LESS 90: HCPCS | Performed by: FAMILY MEDICINE

## 2022-10-13 PROCEDURE — G8536 NO DOC ELDER MAL SCRN: HCPCS | Performed by: FAMILY MEDICINE

## 2022-10-13 PROCEDURE — 1101F PT FALLS ASSESS-DOCD LE1/YR: CPT | Performed by: FAMILY MEDICINE

## 2022-10-13 PROCEDURE — 99214 OFFICE O/P EST MOD 30 MIN: CPT | Performed by: FAMILY MEDICINE

## 2022-10-13 NOTE — PROGRESS NOTES
Chief Complaint   Patient presents with    COPD     1. Have you been to the ER, urgent care clinic since your last visit? Hospitalized since your last visit? No    2. Have you seen or consulted any other health care providers outside of the [address] [place] [address] since your last visit? Include any pap smears or colon screening.  Yes Where: [facility]

## 2022-10-13 NOTE — PROGRESS NOTES
SPORTS MEDICINE AND PRIMARY CARE  [facility]. MD Noa  [address] [address]    Chief Complaint   Patient presents with    COPD       SUBJECTIVE:    Rony Boss is a 79 y.o. male for follow-up evaluation    Past medical history of COPD and atrial fibrillation. Saw pulmonary for copd evaluation  Using albuterol only (meds not brought in)  Has daily baseline productive cough, occasional mild dyspnea  Had a chest CT, another is scheduled  PFT done? No urgent care vists for COPD symptoms in interim  Declines flu vaccine  Last smoked x 1 yr    Afib history  On xarelto and diltiazem  No cp, palitations    Right knee aleena unexpectedly when walking; occasional pain and stiffness; no limited range of motion; no trauma    Covid 19 vaccines- declined    HM- AAA screening due  Current Outpatient Medications   Medication Sig Dispense Refill    tiotropium (SPIRIVA) 18 mcg inhalation capsule Take 1 Capsule by inhalation daily. rivaroxaban (XARELTO) 20 mg tab tablet Take 1 Tablet by mouth daily. 90 Tablet 0    dilTIAZem ER (DILACOR XR) 180 mg capsule Take 1 Capsule by mouth daily. 90 Capsule 0    albuterol (PROVENTIL HFA, VENTOLIN HFA, PROAIR HFA) 90 mcg/actuation inhaler Take 2 Puffs by inhalation every four (4) hours as needed.        Past Medical History:   Diagnosis Date    Chronic obstructive pulmonary disease ([address]      Past Surgical History:   Procedure Laterality Date    HX COLONOSCOPY      [facility]     No Known Allergies    REVIEW OF SYSTEMS:  General: negative for - chills or fever  ENT: negative for - headaches, nasal congestion, tinnitus, hearing loss, vision changes, sore throat  Respiratory: +cough,+shortness of breath negative for -  hemoptysis, or wheezing  Cardiovascular : negative for - chest pain, edema, palpitations or shortness of breath  Gastrointestinal: negative for - abdominal pain, blood in stools, heartburn or nausea/vomiting, diarrhea, constipation  Genito-Urinary: no dysuria, trouble voiding, hematuria or erectile dysfunction  Musculoskeletal: negative for - gait disturbance, joint pain, joint stiffness , joint swelling, muscle aches  Neurological: no TIA or stroke symptoms  Hematologic: no bruises, no bleeding, no swollen glands  Integument: no lumps, mole changes, nail changes or rash  Endocrine:no malaise/lethargy or unexpected weight changes      Social History     Socioeconomic History    Marital status: SINGLE   Tobacco Use    Smoking status: Former     Years: 50.00     Types: Cigarettes     Quit date: 2021     Years since quittin.1    Smokeless tobacco: Never   Vaping Use    Vaping Use: Never used   Substance and Sexual Activity    Alcohol use: Yes     Alcohol/week: 2.0 standard drinks     Types: 2 Cans of beer per week    Drug use: Never    Sexual activity: Not Currently     Family History   Problem Relation Age of Onset    Hearing Impairment Brother     Heart Disease Sister         stents late 63's       OBJECTIVE:     Visit Vitals  BP (!) 132/50 (BP 1 Location: Right arm, BP Patient Position: Sitting)   Pulse 72   Temp 97.9 °F (36.6 °C) (Oral)   Resp 18   Ht 6' 1\" (1.854 m)   Wt 220 lb 9.6 oz (100.1 kg)   SpO2 95%   BMI 29.10 kg/m²     CONSTITUTIONAL: well developed, well nourished, appears age appropriate  EYES: eom intact  ENMT:moist mucous membranes,  NECK: supple. Thyroid normal  RESPIRATORY:bibasilar wheezing,   CARDIOVASCULAR: Heart: irregularly irregular  rhythm, rate wnl  GASTROINTESTINAL: Abdomen: soft  MUSCULOSKELETAL: Extremities: no edema  INTEGUMENT: Warm and dry. Normal turgor   NEUROLOGIC: non-focal exam   MENTAL STATUS: alert and oriented, appropriate affect     ASSESSMENT/PLAN:  1. Chronic obstructive pulmonary disease, unspecified COPD type ([address] -daily symptoms, start spiriva. Continue albuterol. 2. Instability of right knee joint -consider OA, ligament issues. Refer to orthopedics   3. Encounter for abdominal aortic aneurysm (AAA) screening . Refer for abdominal ultrasound   4. Personal history of tobacco use      Orders Placed This Encounter    US EXAM SCREENING AAA    REFERRAL TO ORTHOPEDICS    tiotropium (SPIRIVA) 18 mcg inhalation capsule   RTO 3 mo    I have discussed the diagnosis with the patient and the intended plan as seen in the  orders above. The patient understands and agrees with the plan. The patient has   received an after visit summary. Questions were answered concerning  future plans  Patient labs and/or xrays were reviewed as available. Past records were reviewed as available. Counseled regarding healthy lifestyle          Advised patient to call back or return to office if symptoms develop/worsen/change/persist.  Discussed expected course/resolution/complications of diagnosis in detail with patient. Medication risks/benefits/costs/interactions/alternatives discussed with patient    Juve Davis M.D. This note was created using voice recognition software.   Edits have been made but syntax errors might exist.

## 2022-10-24 ENCOUNTER — HOSPITAL ENCOUNTER (OUTPATIENT)
Dept: ULTRASOUND IMAGING | Age: 67
Discharge: HOME | End: 2022-10-24
Attending: FAMILY MEDICINE

## 2022-10-24 DIAGNOSIS — Z13.6 ENCOUNTER FOR ABDOMINAL AORTIC ANEURYSM (AAA) SCREENING: ICD-10-CM

## 2022-11-10 ENCOUNTER — HOSPITAL ENCOUNTER (OUTPATIENT)
Dept: ULTRASOUND IMAGING | Age: 67
Discharge: HOME | End: 2022-11-10
Attending: FAMILY MEDICINE
Payer: MEDICARE

## 2022-11-10 PROCEDURE — 76706 US ABDL AORTA SCREEN AAA: CPT

## 2022-11-15 NOTE — PROGRESS NOTES
Heart muscle is strong and couple of mildly leaky valves, not a problem or worry.  Stay on meds
Patient notified via phone call.
[FreeTextEntry2] : L knee

## 2022-12-05 ENCOUNTER — OFFICE VISIT (OUTPATIENT)
Dept: CARDIOLOGY CLINIC | Age: 67
End: 2022-12-05
Payer: MEDICARE

## 2022-12-05 VITALS
SYSTOLIC BLOOD PRESSURE: 138 MMHG | WEIGHT: 223.4 LBS | HEIGHT: 73 IN | OXYGEN SATURATION: 96 % | RESPIRATION RATE: 18 BRPM | DIASTOLIC BLOOD PRESSURE: 78 MMHG | BODY MASS INDEX: 29.61 KG/M2 | HEART RATE: 66 BPM

## 2022-12-05 DIAGNOSIS — I10 HYPERTENSION, UNSPECIFIED TYPE: ICD-10-CM

## 2022-12-05 DIAGNOSIS — I48.91 ATRIAL FIBRILLATION, UNSPECIFIED TYPE (HCC): ICD-10-CM

## 2022-12-05 DIAGNOSIS — J43.8 OTHER EMPHYSEMA (HCC): ICD-10-CM

## 2022-12-05 DIAGNOSIS — I48.0 PAROXYSMAL ATRIAL FIBRILLATION (HCC): Primary | ICD-10-CM

## 2022-12-05 PROCEDURE — 3078F DIAST BP <80 MM HG: CPT | Performed by: SPECIALIST

## 2022-12-05 PROCEDURE — G8427 DOCREV CUR MEDS BY ELIG CLIN: HCPCS | Performed by: SPECIALIST

## 2022-12-05 PROCEDURE — G8752 SYS BP LESS 140: HCPCS | Performed by: SPECIALIST

## 2022-12-05 PROCEDURE — 1101F PT FALLS ASSESS-DOCD LE1/YR: CPT | Performed by: SPECIALIST

## 2022-12-05 PROCEDURE — G8510 SCR DEP NEG, NO PLAN REQD: HCPCS | Performed by: SPECIALIST

## 2022-12-05 PROCEDURE — 3017F COLORECTAL CA SCREEN DOC REV: CPT | Performed by: SPECIALIST

## 2022-12-05 PROCEDURE — 3075F SYST BP GE 130 - 139MM HG: CPT | Performed by: SPECIALIST

## 2022-12-05 PROCEDURE — G8536 NO DOC ELDER MAL SCRN: HCPCS | Performed by: SPECIALIST

## 2022-12-05 PROCEDURE — 99213 OFFICE O/P EST LOW 20 MIN: CPT | Performed by: SPECIALIST

## 2022-12-05 PROCEDURE — G8417 CALC BMI ABV UP PARAM F/U: HCPCS | Performed by: SPECIALIST

## 2022-12-05 PROCEDURE — 1123F ACP DISCUSS/DSCN MKR DOCD: CPT | Performed by: SPECIALIST

## 2022-12-05 PROCEDURE — G8754 DIAS BP LESS 90: HCPCS | Performed by: SPECIALIST

## 2022-12-05 RX ORDER — DILTIAZEM HYDROCHLORIDE 180 MG/1
180 CAPSULE, EXTENDED RELEASE ORAL DAILY
Qty: 90 CAPSULE | Refills: 2 | Status: SHIPPED | OUTPATIENT
Start: 2022-12-05

## 2022-12-05 NOTE — PROGRESS NOTES
HISTORY OF PRESENT ILLNESS  Tillman Never is a 79 y.o. male     SUMMARY:   Problem List  Date Reviewed: 12/5/2022            Codes Class Noted    Chronic obstructive pulmonary disease ([facility] ICD-10-CM: J44.9  ICD-9-CM: 254  5/15/2022        Atrial fibrillation ([facility] ICD-10-CM: I48.91  ICD-9-CM: 427.31  2/20/2022        Dermatitis ICD-10-CM: L30.9  ICD-9-CM: 692.9  2/20/2022        PAD (peripheral artery disease) ([facility] ICD-10-CM: I73.9  ICD-9-CM: 443.9  2/20/2022        ECG: old myocardial infarction ICD-10-CM: I25.2  ICD-9-CM: 412  2/20/2022        History of tobacco use ICD-10-CM: Z87.891  ICD-9-CM: V15.82  2/20/2022           Current Outpatient Medications on File Prior to Visit   Medication Sig    tiotropium (SPIRIVA) 18 mcg inhalation capsule Take 1 Capsule by inhalation daily. rivaroxaban (XARELTO) 20 mg tab tablet Take 1 Tablet by mouth daily. dilTIAZem ER (DILACOR XR) 180 mg capsule Take 1 Capsule by mouth daily. albuterol (PROVENTIL HFA, VENTOLIN HFA, PROAIR HFA) 90 mcg/actuation inhaler Take 2 Puffs by inhalation every four (4) hours as needed. No current facility-administered medications on file prior to visit. CARDIOLOGY STUDIES TO DATE:  7/22  echo normal lvef, lvh, lae, mild mr and tr, pa pressure 49mm    Chief Complaint   Patient presents with    Follow-up     6 month     HPI :  He is doing great with no cardiac complaints or problems with his medications. He has been exercising some and other than some mild dyspnea on exertion feels just fine. Recently had an upper respiratory infection but no more COVID.   CARDIAC ROS:   negative for chest pain, palpitations, syncope, orthopnea, paroxysmal nocturnal dyspnea, exertional chest pressure/discomfort, claudication, lower extremity edema    Family History   Problem Relation Age of Onset    Hearing Impairment Brother     Heart Disease Sister         stents late 63's       Past Medical History:   Diagnosis Date    Chronic obstructive pulmonary disease (HCC)        GENERAL ROS:  A comprehensive review of systems was negative except for that written in the HPI. Visit Vitals  /78 (BP 1 Location: Left upper arm, BP Patient Position: Sitting, BP Cuff Size: Large adult)   Pulse 66   Resp 18   Ht 6' 1\" (1.854 m)   Wt 223 lb 6.4 oz (101.3 kg)   SpO2 96%   BMI 29.47 kg/m²       Wt Readings from Last 3 Encounters:   12/05/22 223 lb 6.4 oz (101.3 kg)   10/13/22 220 lb 9.6 oz (100.1 kg)   07/13/22 217 lb (98.4 kg)            BP Readings from Last 3 Encounters:   12/05/22 138/78   10/13/22 (!) 132/50   05/20/22 134/84       PHYSICAL EXAM  General appearance: alert, cooperative, no distress, appears stated age  Neurologic: Alert and oriented X 3  Neck: supple, symmetrical, trachea midline, no adenopathy, no carotid bruit, and no JVD  Lungs: clear to auscultation bilaterally  Heart: irregularly irregular rhythm, S1, S2 normal, no S3 or S4  Extremities: extremities normal, atraumatic, no cyanosis or edema    Lab Results   Component Value Date/Time    Cholesterol, total 145 11/02/2021 05:39 PM    HDL Cholesterol 37 11/02/2021 05:39 PM    LDL, calculated 77 11/02/2021 05:39 PM    Triglyceride 155 (H) 11/02/2021 05:39 PM    CHOL/HDL Ratio 3.9 11/02/2021 05:39 PM     ASSESSMENT :      He is stable and I think asymptomatic at this point well compensated on a good medical regimen and needs no cardiac testing. current treatment plan is effective, no change in therapy  lab results and schedule of future lab studies reviewed with patient  reviewed diet, exercise and weight control    Encounter Diagnoses   Name Primary? Paroxysmal atrial fibrillation (HCC) Yes    Hypertension, unspecified type     Other emphysema ([address]      No orders of the defined types were placed in this encounter. Follow-up and Dispositions    Return in about 6 months (around 6/5/2023).          Benton Pineda MD  12/5/2022  Please note that this dictation was completed with Dragon, the computer voice recognition software. Quite often unanticipated grammatical, syntax, homophones, and other interpretive errors are inadvertently transcribed by the computer software. Please disregard these errors. Please excuse any errors that have escaped final proofreading. Thank you.

## 2023-04-19 ENCOUNTER — OFFICE VISIT (OUTPATIENT)
Dept: INTERNAL MEDICINE CLINIC | Age: 68
End: 2023-04-19
Payer: MEDICARE

## 2023-04-19 VITALS
SYSTOLIC BLOOD PRESSURE: 164 MMHG | HEIGHT: 73 IN | DIASTOLIC BLOOD PRESSURE: 73 MMHG | TEMPERATURE: 98.3 F | HEART RATE: 71 BPM | RESPIRATION RATE: 18 BRPM | WEIGHT: 231.4 LBS | BODY MASS INDEX: 30.67 KG/M2 | OXYGEN SATURATION: 96 %

## 2023-04-19 DIAGNOSIS — I48.0 PAROXYSMAL ATRIAL FIBRILLATION (HCC): ICD-10-CM

## 2023-04-19 DIAGNOSIS — I10 PRIMARY HYPERTENSION: ICD-10-CM

## 2023-04-19 DIAGNOSIS — I73.9 PAD (PERIPHERAL ARTERY DISEASE) (HCC): ICD-10-CM

## 2023-04-19 DIAGNOSIS — Z87.891 HISTORY OF TOBACCO USE: ICD-10-CM

## 2023-04-19 DIAGNOSIS — Z13.39 SCREENING FOR ALCOHOLISM: ICD-10-CM

## 2023-04-19 DIAGNOSIS — J43.8 OTHER EMPHYSEMA (HCC): ICD-10-CM

## 2023-04-19 DIAGNOSIS — Z00.00 MEDICARE ANNUAL WELLNESS VISIT, SUBSEQUENT: Primary | ICD-10-CM

## 2023-04-19 DIAGNOSIS — R35.1 NOCTURIA: ICD-10-CM

## 2023-04-19 DIAGNOSIS — E78.5 DYSLIPIDEMIA: ICD-10-CM

## 2023-04-19 LAB
ALBUMIN SERPL-MCNC: 4.2 G/DL (ref 3.5–5)
ALBUMIN/GLOB SERPL: 1.6 (ref 1.1–2.2)
ALP SERPL-CCNC: 108 U/L (ref 45–117)
ALT SERPL-CCNC: 28 U/L (ref 12–78)
ANION GAP SERPL CALC-SCNC: 5 MMOL/L (ref 5–15)
APPEARANCE UR: ABNORMAL
AST SERPL-CCNC: 18 U/L (ref 15–37)
BACTERIA URNS QL MICRO: NEGATIVE /HPF
BASOPHILS # BLD: 0.1 K/UL (ref 0–0.1)
BASOPHILS NFR BLD: 1 % (ref 0–1)
BILIRUB SERPL-MCNC: 0.4 MG/DL (ref 0.2–1)
BILIRUB UR QL: NEGATIVE
BUN SERPL-MCNC: 15 MG/DL (ref 6–20)
BUN/CREAT SERPL: 13 (ref 12–20)
CALCIUM SERPL-MCNC: 9 MG/DL (ref 8.5–10.1)
CHLORIDE SERPL-SCNC: 109 MMOL/L (ref 97–108)
CHOLEST SERPL-MCNC: 146 MG/DL
CO2 SERPL-SCNC: 27 MMOL/L (ref 21–32)
COLOR UR: ABNORMAL
CREAT SERPL-MCNC: 1.12 MG/DL (ref 0.7–1.3)
DIFFERENTIAL METHOD BLD: ABNORMAL
EOSINOPHIL # BLD: 0.2 K/UL (ref 0–0.4)
EOSINOPHIL NFR BLD: 3 % (ref 0–7)
EPITH CASTS URNS QL MICRO: ABNORMAL /LPF
ERYTHROCYTE [DISTWIDTH] IN BLOOD BY AUTOMATED COUNT: 13.4 % (ref 11.5–14.5)
GLOBULIN SER CALC-MCNC: 2.7 G/DL (ref 2–4)
GLUCOSE SERPL-MCNC: 120 MG/DL (ref 65–100)
GLUCOSE UR STRIP.AUTO-MCNC: NEGATIVE MG/DL
HCT VFR BLD AUTO: 47.7 % (ref 36.6–50.3)
HDLC SERPL-MCNC: 34 MG/DL
HDLC SERPL: 4.3 (ref 0–5)
HGB BLD-MCNC: 15.1 G/DL (ref 12.1–17)
HGB UR QL STRIP: ABNORMAL
HYALINE CASTS URNS QL MICRO: ABNORMAL /LPF (ref 0–5)
IMM GRANULOCYTES # BLD AUTO: 0.1 K/UL (ref 0–0.04)
IMM GRANULOCYTES NFR BLD AUTO: 1 % (ref 0–0.5)
KETONES UR QL STRIP.AUTO: NEGATIVE MG/DL
LDLC SERPL CALC-MCNC: 97.2 MG/DL (ref 0–100)
LEUKOCYTE ESTERASE UR QL STRIP.AUTO: NEGATIVE
LYMPHOCYTES # BLD: 2 K/UL (ref 0.8–3.5)
LYMPHOCYTES NFR BLD: 22 % (ref 12–49)
MCH RBC QN AUTO: 28.2 PG (ref 26–34)
MCHC RBC AUTO-ENTMCNC: 31.7 G/DL (ref 30–36.5)
MCV RBC AUTO: 89.2 FL (ref 80–99)
MONOCYTES # BLD: 0.7 K/UL (ref 0–1)
MONOCYTES NFR BLD: 7 % (ref 5–13)
NEUTS SEG # BLD: 6.2 K/UL (ref 1.8–8)
NEUTS SEG NFR BLD: 66 % (ref 32–75)
NITRITE UR QL STRIP.AUTO: NEGATIVE
NRBC # BLD: 0 K/UL (ref 0–0.01)
NRBC BLD-RTO: 0 PER 100 WBC
PH UR STRIP: 5.5 (ref 5–8)
PLATELET # BLD AUTO: 209 K/UL (ref 150–400)
PMV BLD AUTO: 11.4 FL (ref 8.9–12.9)
POTASSIUM SERPL-SCNC: 4.1 MMOL/L (ref 3.5–5.1)
PROT SERPL-MCNC: 6.9 G/DL (ref 6.4–8.2)
PROT UR STRIP-MCNC: ABNORMAL MG/DL
PSA SERPL-MCNC: 14 NG/ML (ref 0.01–4)
RBC # BLD AUTO: 5.35 M/UL (ref 4.1–5.7)
RBC #/AREA URNS HPF: ABNORMAL /HPF (ref 0–5)
SODIUM SERPL-SCNC: 141 MMOL/L (ref 136–145)
SP GR UR REFRACTOMETRY: 1.02 (ref 1–1.03)
TRIGL SERPL-MCNC: 74 MG/DL (ref ?–150)
UROBILINOGEN UR QL STRIP.AUTO: 0.2 EU/DL (ref 0.2–1)
VLDLC SERPL CALC-MCNC: 14.8 MG/DL
WBC # BLD AUTO: 9.2 K/UL (ref 4.1–11.1)
WBC URNS QL MICRO: ABNORMAL /HPF (ref 0–4)

## 2023-04-19 PROCEDURE — 1123F ACP DISCUSS/DSCN MKR DOCD: CPT | Performed by: INTERNAL MEDICINE

## 2023-04-19 PROCEDURE — 3077F SYST BP >= 140 MM HG: CPT | Performed by: INTERNAL MEDICINE

## 2023-04-19 PROCEDURE — 1101F PT FALLS ASSESS-DOCD LE1/YR: CPT | Performed by: INTERNAL MEDICINE

## 2023-04-19 PROCEDURE — G8432 DEP SCR NOT DOC, RNG: HCPCS | Performed by: INTERNAL MEDICINE

## 2023-04-19 PROCEDURE — G8427 DOCREV CUR MEDS BY ELIG CLIN: HCPCS | Performed by: INTERNAL MEDICINE

## 2023-04-19 PROCEDURE — 99213 OFFICE O/P EST LOW 20 MIN: CPT | Performed by: INTERNAL MEDICINE

## 2023-04-19 PROCEDURE — G8417 CALC BMI ABV UP PARAM F/U: HCPCS | Performed by: INTERNAL MEDICINE

## 2023-04-19 PROCEDURE — 3017F COLORECTAL CA SCREEN DOC REV: CPT | Performed by: INTERNAL MEDICINE

## 2023-04-19 PROCEDURE — G8536 NO DOC ELDER MAL SCRN: HCPCS | Performed by: INTERNAL MEDICINE

## 2023-04-19 PROCEDURE — 3078F DIAST BP <80 MM HG: CPT | Performed by: INTERNAL MEDICINE

## 2023-04-19 PROCEDURE — G0439 PPPS, SUBSEQ VISIT: HCPCS | Performed by: INTERNAL MEDICINE

## 2023-04-19 RX ORDER — FLUTICASONE FUROATE, UMECLIDINIUM BROMIDE AND VILANTEROL TRIFENATATE 100; 62.5; 25 UG/1; UG/1; UG/1
POWDER RESPIRATORY (INHALATION)
COMMUNITY
Start: 2023-03-27

## 2023-04-19 RX ORDER — SILDENAFIL 100 MG/1
100 TABLET, FILM COATED ORAL AS NEEDED
Qty: 10 TABLET | Refills: 11 | Status: SHIPPED | OUTPATIENT
Start: 2023-04-19

## 2023-04-19 NOTE — PATIENT INSTRUCTIONS
Medicare Wellness Visit, Male    The best way to live healthy is to have a lifestyle where you eat a well-balanced diet, exercise regularly, limit alcohol use, and quit all forms of tobacco/nicotine, if applicable. Regular preventive services are another way to keep healthy. Preventive services (vaccines, screening tests, monitoring & exams) can help personalize your care plan, which helps you manage your own care. Screening tests can find health problems at the earliest stages, when they are easiest to treat. [facility] follows the current, evidence-based guidelines published by the [facility] ([facility]) when recommending preventive services for our patients. Because we follow these guidelines, sometimes recommendations change over time as research supports it. (For example, a prostate screening blood test is no longer routinely recommended for men with no symptoms). Of course, you and your doctor may decide to screen more often for some diseases, based on your risk and co-morbidities (chronic disease you are already diagnosed with). Preventive services for you include:  - Medicare offers their members a free annual wellness visit, which is time for you and your primary care provider to discuss and plan for your preventive service needs.  Take advantage of this benefit every year!    -Over the age of 72 should receive the recommended pneumonia vaccines.   -All adults should have a flu vaccine yearly.  -All adults should receive a tetanus vaccine every 10 years.  -Over the age of 48 should receive the shingles vaccines.    -All adults should be screened once for Hepatitis C.  -All adults age 38-68 who are overweight should have a diabetes screening test once every three years.   -Other screening tests & preventive services for persons with diabetes include: an eye exam to screen for diabetic retinopathy, a kidney function test, a foot exam, and stricter control over your cholesterol.   -Cardiovascular screening for adults with routine risk involves an electrocardiogram (ECG) at intervals determined by the provider.     -Colorectal cancer screening should be done for adults age 43-69 with no increased risk factors for colorectal cancer. There are a number of acceptable methods of screening for this type of cancer. Each test has its own benefits and drawbacks. Discuss with your provider what is most appropriate for you during your annual wellness visit. The different tests include: colonoscopy (considered the best screening method), a fecal occult blood test, a fecal DNA test, and sigmoidoscopy.    -Lung cancer screening is recommended annually with a low dose CT scan for adults between age 54 and 68, who have smoked at least 30 pack years (equivalent of 1 pack per day for 30 days), and who is a current smoker or quit less than 15 years ago. -An Abdominal Aortic Aneurysm (AAA) Screening is recommended for men age 73-68 who has ever smoked in their lifetime.      Here is a list of your current Health Maintenance items (your personalized list of preventive services) with a due date:  Health Maintenance Due   Topic Date Due    COVID-19 Vaccine (1) Never done    DTaP/Tdap/Td  (1 - Tdap) Never done    Colorectal Screening  Never done    Shingles Vaccine (1 of 2) Never done

## 2023-04-19 NOTE — PROGRESS NOTES
SPORTS MEDICINE AND PRIMARY CARE  Rhona Solorio MD, [address] [address]  Phone:  [phone]  Fax: [phone]       Chief Complaint   Patient presents with    Hypertension   . SUBJECTIVE:    Ean Krueger is a 76 y.o. male Patient is seen as a new patient to our services to establish care. There is a history of tobacco use, COPD, PAD, paroxysmal atrial fibrillation, hypertension and is seen for evaluation. Patient comes in today voicing no new complaints and is seen for evaluation. He is followed by [facility], as well as cardiology, Dr. Sabrina Kearns. Current Outpatient Medications   Medication Sig Dispense Refill    Trelegy Ellipta 100-62.5-25 mcg inhaler TAKE 1 PUFF BY MOUTH EVERY DAY      sildenafil citrate (VIAGRA) 100 mg tablet Take 1 Tablet by mouth as needed for Erectile Dysfunction. 10 Tablet 11    rivaroxaban (XARELTO) 20 mg tab tablet Take 1 Tablet by mouth daily. 90 Tablet 2    dilTIAZem ER (DILACOR XR) 180 mg capsule Take 1 Capsule by mouth daily. 90 Capsule 2    albuterol (PROVENTIL HFA, VENTOLIN HFA, PROAIR HFA) 90 mcg/actuation inhaler Take 2 Puffs by inhalation every four (4) hours as needed. tiotropium (SPIRIVA) 18 mcg inhalation capsule Take 1 Capsule by inhalation daily.  (Patient not taking: Reported on 4/19/2023)       Past Medical History:   Diagnosis Date    Chronic obstructive pulmonary disease ([address]     History of cigarette smoking 1973    49 pyh - ct via par    HTN (hypertension) 04/19/2023     Past Surgical History:   Procedure Laterality Date    HX COLONOSCOPY      [facility]     No Known Allergies      REVIEW OF SYSTEMS:  General: negative for - chills or fever  ENT: negative for - headaches, nasal congestion or tinnitus  Respiratory: negative for - cough, hemoptysis, shortness of breath or wheezing  Cardiovascular : negative for - chest pain, edema, palpitations or shortness of breath  Gastrointestinal: negative for - abdominal pain, blood in stools, heartburn or nausea/vomiting  Genito-Urinary: no dysuria, trouble voiding, or hematuria  Musculoskeletal: negative for - gait disturbance, joint pain, joint stiffness or joint swelling  Neurological: no TIA or stroke symptoms  Hematologic: no bruises, no bleeding, no swollen glands  Integument: no lumps, mole changes, nail changes or rash  Endocrine: no malaise/lethargy or unexpected weight changes      Social History     Socioeconomic History    Marital status: SINGLE   Tobacco Use    Smoking status: Former     Years: 50.00     Types: Cigarettes     Quit date: 2021     Years since quittin.6    Smokeless tobacco: Never   Vaping Use    Vaping Use: Never used   Substance and Sexual Activity    Alcohol use: Yes     Alcohol/week: 2.0 standard drinks     Types: 2 Cans of beer per week    Drug use: Never    Sexual activity: Not Currently     Social Determinants of Health     Financial Resource Strain: Low Risk     Difficulty of Paying Living Expenses: Not very hard   Food Insecurity: Food Insecurity Present    Worried About Running Out of Food in the Last Year: Sometimes true    Ran Out of Food in the Last Year: Sometimes true   Transportation Needs: Unmet Transportation Needs    Lack of Transportation (Medical): Yes   Housing Stability: Unknown    Unable to Pay for Housing in the Last Year: No    Unstable Housing in the Last Year: No     Family History   Problem Relation Age of Onset    Hearing Impairment Brother     Heart Disease Sister         stents late 60's       OBJECTIVE:    Visit Vitals  BP (!) 164/73 (BP 1 Location: Left upper arm, BP Patient Position: Sitting)   Pulse 71   Temp 98.3 °F (36.8 °C) (Oral)   Resp 18   Ht 6' 1\" (1.854 m)   Wt 231 lb 6.4 oz (105 kg)   SpO2 96%   BMI 30.53 kg/m²     CONSTITUTIONAL: well , well nourished, appears age appropriate  EYES: perrla, eom intact  ENMT:moist mucous membranes, pharynx clear  NECK: supple.  Thyroid normal  RESPIRATORY: Chest: clear bilaterally   CARDIOVASCULAR: Heart: regular rate and rhythm  GASTROINTESTINAL: Abdomen: soft, bowel sounds active  HEMATOLOGIC: no pathological lymph nodes palpated  MUSCULOSKELETAL: Extremities: no edema, pulse 1+   INTEGUMENT: No unusual rashes or suspicious skin lesions noted. Nails appear normal.  NEUROLOGIC: non-focal exam   MENTAL STATUS: alert and oriented, appropriate affect           ASSESSMENT:  1. Medicare annual wellness visit, subsequent    2. Other emphysema ([facility] [address]    3. History of tobacco use    4. PAD (peripheral artery disease) (HCC)    5. Paroxysmal atrial fibrillation ([facility] [address]    6. Screening for alcoholism    7. Primary hypertension    8. Dyslipidemia    9. Nocturia      Blood pressure elevation is noted. He states his blood pressure is usually not elevated, but he does not recall what his readings are at home. There is a history of tobacco abuse, which is followed by [facility]. He had a CT scan recently of his chest.  We advised him he needs one every year for the next 15 years. Apparently, that is going to be accomplished through [facility]. History of emphysema, which I assume is followed by [facility] also. PAD is asymptomatic currently. History of paroxysmal atrial fibrillation, followed by Yaneth Crystal MD, currently in sinus rhythm. He will be back to the office in a month, primarily so we can look at his blood pressure. If it remains elevated, we will put him on something else for his blood pressure. We suggested he check it at home once a week and write down the readings and bring those with him when he comes back to see us in a month. I have discussed the diagnosis with the patient and the intended plan as seen in the  Orders. The patient understands and agees with the plan.   The patient has   received an after visit summary and questions were answered concerning  future plans  Patient labs and/or xrays were reviewed  Past records were reviewed. PLAN:  .  Orders Placed This Encounter    URINALYSIS W/ RFLX MICROSCOPIC    CBC WITH AUTOMATED DIFF    METABOLIC PANEL, COMPREHENSIVE    LIPID PANEL    PROSTATE SPECIFIC AG    Trelegy Ellipta 100-62.5-25 mcg inhaler    sildenafil citrate (VIAGRA) 100 mg tablet       Follow-up and Dispositions    Return in about 4 weeks (around 5/17/2023). ATTENTION:   This medical record was transcribed using an electronic medical records system. Although proofread, it may and can contain electronic and spelling errors. Other human spelling and other errors may be present. Corrections may be executed at a later time. Please feel free to contact us for any clarifications as needed.

## 2023-04-19 NOTE — PROGRESS NOTES
Fracisco Robins is a 76 y.o. male    Chief Complaint   Patient presents with    Hypertension     1. Have you been to the ER, urgent care clinic since your last visit? Hospitalized since your last visit? No    2. Have you seen or consulted any other health care providers outside of the [address] [place] [address] since your last visit? Include any pap smears or colon screening. No  This is the Subsequent Medicare Annual Wellness Exam, performed 12 months or more after the Initial AWV or the last Subsequent AWV    I have reviewed the patient's medical history in detail and updated the computerized patient record. Assessment/Plan   Education and counseling provided:  Are appropriate based on today's review and evaluation    1. History of tobacco use  2. Other emphysema ([address]  Assessment & Plan:   borderline controlled, continue current medications  3. PAD (peripheral artery disease) (HCC)  Assessment & Plan:   borderline controlled, continue current medications  4. Paroxysmal atrial fibrillation (HCC)  Assessment & Plan:   monitored by specialist. No acute findings meriting change in the plan  5. Medicare annual wellness visit, subsequent  6.  Screening for alcoholism       Depression Risk Factor Screening     3 most recent PHQ Screens 4/19/2023   Little interest or pleasure in doing things Not at all   Feeling down, depressed, irritable, or hopeless Not at all   Total Score PHQ 2 0   Trouble falling or staying asleep, or sleeping too much Not at all   Feeling tired or having little energy Not at all   Poor appetite, weight loss, or overeating Not at all   Feeling bad about yourself - or that you are a failure or have let yourself or your family down Not at all   Trouble concentrating on things such as school, work, reading, or watching TV Not at all   Moving or speaking so slowly that other people could have noticed; or the opposite being so fidgety that others notice Not at all   Thoughts of being better off dead, or hurting yourself in some way Not at all   PHQ 9 Score 0   How difficult have these problems made it for you to do your work, take care of your home and get along with others Not difficult at all       Alcohol & Drug Abuse Risk Screen    Do you average more than 1 drink per night or more than 7 drinks a week: No    In the past three months have you have had more than 4 drinks containing alcohol on one occasion: Yes          Functional Ability and Level of Safety    Hearing: Hearing is good. Activities of Daily Living: The home contains: no safety equipment. Patient does total self care      Ambulation: with no difficulty     Fall Risk:  Fall Risk Assessment, last 12 mths 4/19/2023   Able to walk? Yes   Fall in past 12 months? 0   Do you feel unsteady?  0   Are you worried about falling -      Abuse Screen:  Patient is not abused       Cognitive Screening    Has your family/caregiver stated any concerns about your memory: no     Cognitive Screening: Normal - Verbal Fluency Test    Health Maintenance Due     Health Maintenance Due   Topic Date Due    COVID-19 Vaccine (1) Never done    DTaP/Tdap/Td series (1 - Tdap) Never done    Colorectal Cancer Screening Combo  Never done    Shingles Vaccine (1 of 2) Never done       Patient Care Team   Patient Care Team:  Elissa Underwood MD as PCP - General (Internal Medicine Physician)    History     Patient Active Problem List   Diagnosis Code    Atrial fibrillation (HCC) I48.91    Dermatitis L30.9    PAD (peripheral artery disease) (HCC) I73.9    ECG: old myocardial infarction I25.2    History of tobacco use Z87.891    Chronic obstructive pulmonary disease ([facility] [address] J44.9     Past Medical History:   Diagnosis Date    Chronic obstructive pulmonary disease ([facility] [address]       Past Surgical History:   Procedure Laterality Date    HX COLONOSCOPY      [facility]     Current Outpatient Medications   Medication Sig Dispense Refill    Trelegy Ellipta 100-62.5-25 mcg inhaler TAKE 1 PUFF BY MOUTH EVERY DAY      rivaroxaban (XARELTO) 20 mg tab tablet Take 1 Tablet by mouth daily. 90 Tablet 2    dilTIAZem ER (DILACOR XR) 180 mg capsule Take 1 Capsule by mouth daily. 90 Capsule 2    albuterol (PROVENTIL HFA, VENTOLIN HFA, PROAIR HFA) 90 mcg/actuation inhaler Take 2 Puffs by inhalation every four (4) hours as needed. tiotropium (SPIRIVA) 18 mcg inhalation capsule Take 1 Capsule by inhalation daily. (Patient not taking: Reported on 2023)       No Known Allergies    Family History   Problem Relation Age of Onset    Hearing Impairment Brother     Heart Disease Sister         stents late 63's     Social History     Tobacco Use    Smoking status: Former     Years: 50.00     Types: Cigarettes     Quit date: 2021     Years since quittin.6    Smokeless tobacco: Never   Substance Use Topics    Alcohol use:  Yes     Alcohol/week: 2.0 standard drinks     Types: 2 Cans of beer per week         Keron Andres MA

## 2023-04-20 DIAGNOSIS — R97.20 PSA ELEVATION: Primary | ICD-10-CM

## 2023-04-21 NOTE — PROGRESS NOTES
I discussed with you on the phone this evening laboratory studies are normal with the exception of the elevated PSA.   I have placed a referral for you to see a urologist for an opinion

## 2023-04-26 ENCOUNTER — PATIENT MESSAGE (OUTPATIENT)
Dept: INTERNAL MEDICINE CLINIC | Age: 68
End: 2023-04-26

## 2023-05-17 RX ORDER — FLUTICASONE FUROATE, UMECLIDINIUM BROMIDE AND VILANTEROL TRIFENATATE 100; 62.5; 25 UG/1; UG/1; UG/1
POWDER RESPIRATORY (INHALATION)
COMMUNITY
Start: 2023-03-27

## 2023-05-17 RX ORDER — ALBUTEROL SULFATE 90 UG/1
2 AEROSOL, METERED RESPIRATORY (INHALATION) EVERY 4 HOURS PRN
COMMUNITY
Start: 2021-09-10

## 2023-05-17 RX ORDER — DILTIAZEM HYDROCHLORIDE 180 MG/1
180 CAPSULE, EXTENDED RELEASE ORAL DAILY
COMMUNITY
Start: 2022-12-05 | End: 2023-06-05 | Stop reason: SDUPTHER

## 2023-05-17 RX ORDER — SILDENAFIL 100 MG/1
100 TABLET, FILM COATED ORAL PRN
COMMUNITY
Start: 2023-04-19 | End: 2023-06-01

## 2023-05-31 SDOH — ECONOMIC STABILITY: HOUSING INSECURITY
IN THE LAST 12 MONTHS, WAS THERE A TIME WHEN YOU DID NOT HAVE A STEADY PLACE TO SLEEP OR SLEPT IN A SHELTER (INCLUDING NOW)?: NO

## 2023-05-31 SDOH — ECONOMIC STABILITY: FOOD INSECURITY: WITHIN THE PAST 12 MONTHS, THE FOOD YOU BOUGHT JUST DIDN'T LAST AND YOU DIDN'T HAVE MONEY TO GET MORE.: SOMETIMES TRUE

## 2023-05-31 SDOH — ECONOMIC STABILITY: INCOME INSECURITY: HOW HARD IS IT FOR YOU TO PAY FOR THE VERY BASICS LIKE FOOD, HOUSING, MEDICAL CARE, AND HEATING?: SOMEWHAT HARD

## 2023-05-31 SDOH — ECONOMIC STABILITY: TRANSPORTATION INSECURITY
IN THE PAST 12 MONTHS, HAS LACK OF TRANSPORTATION KEPT YOU FROM MEETINGS, WORK, OR FROM GETTING THINGS NEEDED FOR DAILY LIVING?: NO

## 2023-05-31 SDOH — ECONOMIC STABILITY: FOOD INSECURITY: WITHIN THE PAST 12 MONTHS, YOU WORRIED THAT YOUR FOOD WOULD RUN OUT BEFORE YOU GOT MONEY TO BUY MORE.: SOMETIMES TRUE

## 2023-06-01 ENCOUNTER — OFFICE VISIT (OUTPATIENT)
Facility: CLINIC | Age: 68
End: 2023-06-01
Payer: MEDICARE

## 2023-06-01 VITALS
WEIGHT: 233.2 LBS | BODY MASS INDEX: 30.91 KG/M2 | HEART RATE: 66 BPM | SYSTOLIC BLOOD PRESSURE: 153 MMHG | DIASTOLIC BLOOD PRESSURE: 75 MMHG | OXYGEN SATURATION: 96 % | HEIGHT: 73 IN | TEMPERATURE: 98.3 F | RESPIRATION RATE: 20 BRPM

## 2023-06-01 DIAGNOSIS — R97.20 ELEVATED PSA: ICD-10-CM

## 2023-06-01 DIAGNOSIS — Z87.891 HISTORY OF TOBACCO USE: ICD-10-CM

## 2023-06-01 DIAGNOSIS — J44.9 CHRONIC OBSTRUCTIVE PULMONARY DISEASE, UNSPECIFIED COPD TYPE (HCC): ICD-10-CM

## 2023-06-01 DIAGNOSIS — I48.91 ATRIAL FIBRILLATION, UNSPECIFIED TYPE (HCC): ICD-10-CM

## 2023-06-01 DIAGNOSIS — I10 PRIMARY HYPERTENSION: Primary | ICD-10-CM

## 2023-06-01 DIAGNOSIS — I73.9 PAD (PERIPHERAL ARTERY DISEASE) (HCC): ICD-10-CM

## 2023-06-01 PROCEDURE — 3078F DIAST BP <80 MM HG: CPT | Performed by: INTERNAL MEDICINE

## 2023-06-01 PROCEDURE — 3077F SYST BP >= 140 MM HG: CPT | Performed by: INTERNAL MEDICINE

## 2023-06-01 PROCEDURE — 1123F ACP DISCUSS/DSCN MKR DOCD: CPT | Performed by: INTERNAL MEDICINE

## 2023-06-01 PROCEDURE — 99213 OFFICE O/P EST LOW 20 MIN: CPT | Performed by: INTERNAL MEDICINE

## 2023-06-01 RX ORDER — LOSARTAN POTASSIUM 100 MG/1
100 TABLET ORAL DAILY
Qty: 90 TABLET | Refills: 3 | Status: SHIPPED | OUTPATIENT
Start: 2023-06-01

## 2023-06-01 ASSESSMENT — PATIENT HEALTH QUESTIONNAIRE - PHQ9
2. FEELING DOWN, DEPRESSED OR HOPELESS: 0
SUM OF ALL RESPONSES TO PHQ QUESTIONS 1-9: 0
SUM OF ALL RESPONSES TO PHQ9 QUESTIONS 1 & 2: 0
SUM OF ALL RESPONSES TO PHQ QUESTIONS 1-9: 0
1. LITTLE INTEREST OR PLEASURE IN DOING THINGS: 0

## 2023-06-01 NOTE — PROGRESS NOTES
Chief Complaint   Patient presents with    Hypertension     Jese Rebolledo is a 76 y.o. male  Chief Complaint   Patient presents with    Hypertension         YES Answers must have Comments  1. \"Have you been to the ER, urgent care clinic since your last visit? Hospitalized since your last visit? \"    [] YES   [x] NO       2. Have you seen or consulted any other health care providers outside of [address] [place] [address] since your last visit?     [x] YES   [] NO       3. For patients aged 39-70: Have you had a colorectal cancer screening such as a colonoscopy/FIT/Cologuard? Nurse/CMA to request records if not in chart   [] YES   [x] NO   [] NA, based on age    If the patient is female:      4. For female patients aged 41-77: [facility] Nurse you had a mammogram in the last two years?  Nurse/CMA to request records if not in chart   [] YES   [] NO   [] NA, based on age    11. For female patients aged 21-65: [facility] Nurse you had a pap smear?   Nurse/CMA to request records if not in chart   [] YES   [] NO  [] NA, based on age
MPV 04/19/2023 11.4  8.9 - 12.9 FL Final    Nucleated RBCs 04/19/2023 0.0  0  WBC Final    NRBC Absolute 04/19/2023 0.00  0.00 - 0.01 K/uL Final    Neutrophils % 04/19/2023 66  32 - 75 % Final    Lymphocytes % 04/19/2023 22  12 - 49 % Final    Monocytes % 04/19/2023 7  5 - 13 % Final    Eosinophils % 04/19/2023 3  0 - 7 % Final    Basophils % 04/19/2023 1  0 - 1 % Final    Immature Granulocytes 04/19/2023 1 (H)  0.0 - 0.5 % Final    Neutrophils Absolute 04/19/2023 6.2  1.8 - 8.0 K/UL Final    Lymphocytes Absolute 04/19/2023 2.0  0.8 - 3.5 K/UL Final    Monocytes Absolute 04/19/2023 0.7  0.0 - 1.0 K/UL Final    Eosinophils Absolute 04/19/2023 0.2  0.0 - 0.4 K/UL Final    Basophils Absolute 04/19/2023 0.1  0.0 - 0.1 K/UL Final    Granulocyte Absolute Count 04/19/2023 0.1 (H)  0.00 - 0.04 K/UL Final    Differential Type 04/19/2023 AUTOMATED    Final    Pros. Spec. Antigen 04/19/2023 14.0 (H)  0.01 - 4.0 ng/mL Final    Comment: Method used is [employer]  (NOTE)  Many types of test methods are used to measure PSA and can yield   different results on any given specimen. Therefore PSA results from   different laboratories on the same patient are not directly   comparable. In addition, PSA values by themselves should not be   interpreted as the presence or absence of cancer. PSA values used to   monitor for biochemical recurrence of prostate cancer should be   interpreted in accordance with current clinical guidelines (e.g. the   2013 American Urological Association (AUA) guidelines and the 2015    Association of Urology (EAU) guidelines).       Color, UA 04/19/2023 YELLOW/STRAW    Final    Color Reference Range: Straw, Yellow or Dark Yellow    Clarity, UA 04/19/2023 TURBID (A)  CLEAR   Final    Specific Gravity, UA 04/19/2023 1.023  1.003 - 1.030   Final    pH, UA 04/19/2023 5.5  5.0 - 8.0   Final    Protein, UA 04/19/2023 TRACE (A)  Negative mg/dL Final    Glucose, Ur 04/19/2023 Negative

## 2023-06-05 ENCOUNTER — OFFICE VISIT (OUTPATIENT)
Age: 68
End: 2023-06-05
Payer: MEDICARE

## 2023-06-05 VITALS
SYSTOLIC BLOOD PRESSURE: 162 MMHG | WEIGHT: 230.8 LBS | OXYGEN SATURATION: 96 % | BODY MASS INDEX: 30.59 KG/M2 | HEIGHT: 73 IN | HEART RATE: 79 BPM | DIASTOLIC BLOOD PRESSURE: 58 MMHG

## 2023-06-05 DIAGNOSIS — J43.8 OTHER EMPHYSEMA (HCC): ICD-10-CM

## 2023-06-05 DIAGNOSIS — I10 PRIMARY HYPERTENSION: Primary | ICD-10-CM

## 2023-06-05 DIAGNOSIS — I48.91 ATRIAL FIBRILLATION, UNSPECIFIED TYPE (HCC): ICD-10-CM

## 2023-06-05 PROCEDURE — 3077F SYST BP >= 140 MM HG: CPT | Performed by: SPECIALIST

## 2023-06-05 PROCEDURE — 99213 OFFICE O/P EST LOW 20 MIN: CPT | Performed by: SPECIALIST

## 2023-06-05 PROCEDURE — 3078F DIAST BP <80 MM HG: CPT | Performed by: SPECIALIST

## 2023-06-05 PROCEDURE — 1123F ACP DISCUSS/DSCN MKR DOCD: CPT | Performed by: SPECIALIST

## 2023-06-05 RX ORDER — DILTIAZEM HYDROCHLORIDE 180 MG/1
180 CAPSULE, EXTENDED RELEASE ORAL DAILY
Qty: 180 CAPSULE | Refills: 1 | Status: SHIPPED | OUTPATIENT
Start: 2023-06-05

## 2023-06-05 ASSESSMENT — PATIENT HEALTH QUESTIONNAIRE - PHQ9
SUM OF ALL RESPONSES TO PHQ QUESTIONS 1-9: 0
SUM OF ALL RESPONSES TO PHQ QUESTIONS 1-9: 0
SUM OF ALL RESPONSES TO PHQ9 QUESTIONS 1 & 2: 0
SUM OF ALL RESPONSES TO PHQ QUESTIONS 1-9: 0
1. LITTLE INTEREST OR PLEASURE IN DOING THINGS: 0
SUM OF ALL RESPONSES TO PHQ QUESTIONS 1-9: 0
1. LITTLE INTEREST OR PLEASURE IN DOING THINGS: 0
2. FEELING DOWN, DEPRESSED OR HOPELESS: 0
SUM OF ALL RESPONSES TO PHQ QUESTIONS 1-9: 0
SUM OF ALL RESPONSES TO PHQ9 QUESTIONS 1 & 2: 0
SUM OF ALL RESPONSES TO PHQ QUESTIONS 1-9: 0
2. FEELING DOWN, DEPRESSED OR HOPELESS: 0

## 2023-06-05 NOTE — PROGRESS NOTES
recognition software. Quite often unanticipated grammatical, syntax, homophones, and other interpretive errors are inadvertently transcribed by the computer software. Please disregard these errors. Please excuse any errors that have escaped final proofreading. Thank you.

## 2023-06-06 ENCOUNTER — TELEPHONE (OUTPATIENT)
Age: 68
End: 2023-06-06

## 2023-06-06 NOTE — TELEPHONE ENCOUNTER
Called pt. Verified patient's identity with two identifiers. Pt stated the diltiazem he picked up from pharmacy looked different, but both say diltiazem 180 mg extended release. Advised pt he could verify with his pharmacy, may just be a different . I told pt just to be sure he is not taking both daily. Pt agreed and denied further questions or concerns.

## 2023-06-27 ENCOUNTER — TELEPHONE (OUTPATIENT)
Age: 68
End: 2023-06-27